# Patient Record
Sex: FEMALE | Race: WHITE | NOT HISPANIC OR LATINO
[De-identification: names, ages, dates, MRNs, and addresses within clinical notes are randomized per-mention and may not be internally consistent; named-entity substitution may affect disease eponyms.]

---

## 2017-02-23 PROBLEM — Z00.00 ENCOUNTER FOR PREVENTIVE HEALTH EXAMINATION: Status: ACTIVE | Noted: 2017-02-23

## 2017-02-27 ENCOUNTER — APPOINTMENT (OUTPATIENT)
Dept: OTOLARYNGOLOGY | Facility: CLINIC | Age: 27
End: 2017-02-27

## 2017-02-27 VITALS
BODY MASS INDEX: 33.38 KG/M2 | DIASTOLIC BLOOD PRESSURE: 98 MMHG | SYSTOLIC BLOOD PRESSURE: 136 MMHG | HEIGHT: 60 IN | WEIGHT: 170 LBS | HEART RATE: 114 BPM

## 2017-02-27 DIAGNOSIS — Z83.3 FAMILY HISTORY OF DIABETES MELLITUS: ICD-10-CM

## 2017-03-13 ENCOUNTER — APPOINTMENT (OUTPATIENT)
Dept: OTOLARYNGOLOGY | Facility: CLINIC | Age: 27
End: 2017-03-13

## 2017-03-13 VITALS
SYSTOLIC BLOOD PRESSURE: 144 MMHG | DIASTOLIC BLOOD PRESSURE: 97 MMHG | HEIGHT: 60 IN | WEIGHT: 170 LBS | HEART RATE: 110 BPM | BODY MASS INDEX: 33.38 KG/M2

## 2017-11-02 ENCOUNTER — MESSAGE (OUTPATIENT)
Age: 27
End: 2017-11-02

## 2020-01-08 ENCOUNTER — APPOINTMENT (OUTPATIENT)
Dept: OTOLARYNGOLOGY | Facility: CLINIC | Age: 30
End: 2020-01-08
Payer: COMMERCIAL

## 2020-01-08 VITALS
HEIGHT: 60 IN | DIASTOLIC BLOOD PRESSURE: 93 MMHG | HEART RATE: 129 BPM | WEIGHT: 170 LBS | SYSTOLIC BLOOD PRESSURE: 152 MMHG | BODY MASS INDEX: 33.38 KG/M2

## 2020-01-08 PROCEDURE — 99214 OFFICE O/P EST MOD 30 MIN: CPT | Mod: 25

## 2020-01-08 PROCEDURE — 69210 REMOVE IMPACTED EAR WAX UNI: CPT

## 2020-01-08 NOTE — ASSESSMENT
[FreeTextEntry1] : wax-\par After informed verbal consent is obtained, cerumen is removed from the right and left  ear canal with a curette and suction.\par Rx: \par Debrox was prescribed and  is to be placed in both ears on a routine basis to keep them free of wax.\par Routine debridement suggested to keep the ears free of wax.\par \par f/u prn

## 2020-01-08 NOTE — HISTORY OF PRESENT ILLNESS
[de-identified] : 28 yo female w/ severe hearing loss x weeks \par poss recent URI\par no other modifying factors\par no nasal or throat complaints\par  [No] : patient does not have a  history of radiation therapy [Eustachian Tube Dysfunction] : eustachian tube dysfunction [None] : No risk factors have been identified.

## 2020-01-08 NOTE — PHYSICAL EXAM
[] : septum deviated bilaterally [de-identified] : bilat wax [Midline] : trachea located in midline position [Normal] : no rashes

## 2020-01-08 NOTE — REASON FOR VISIT
[Subsequent Evaluation] : a subsequent evaluation for [Hearing Loss] : hearing loss [Nasal Obstruction] : nasal obstruction [Parent] : parent

## 2020-01-22 ENCOUNTER — TRANSCRIPTION ENCOUNTER (OUTPATIENT)
Age: 30
End: 2020-01-22

## 2020-09-30 ENCOUNTER — APPOINTMENT (OUTPATIENT)
Dept: OTOLARYNGOLOGY | Facility: CLINIC | Age: 30
End: 2020-09-30

## 2020-11-16 ENCOUNTER — APPOINTMENT (OUTPATIENT)
Dept: OTOLARYNGOLOGY | Facility: CLINIC | Age: 30
End: 2020-11-16

## 2021-09-03 ENCOUNTER — NON-APPOINTMENT (OUTPATIENT)
Age: 31
End: 2021-09-03

## 2021-12-20 ENCOUNTER — APPOINTMENT (OUTPATIENT)
Dept: OTOLARYNGOLOGY | Facility: CLINIC | Age: 31
End: 2021-12-20
Payer: COMMERCIAL

## 2021-12-20 VITALS
TEMPERATURE: 98 F | WEIGHT: 170 LBS | BODY MASS INDEX: 33.38 KG/M2 | SYSTOLIC BLOOD PRESSURE: 152 MMHG | HEART RATE: 93 BPM | HEIGHT: 60 IN | DIASTOLIC BLOOD PRESSURE: 101 MMHG

## 2021-12-20 PROCEDURE — 69210 REMOVE IMPACTED EAR WAX UNI: CPT

## 2021-12-20 PROCEDURE — 99214 OFFICE O/P EST MOD 30 MIN: CPT | Mod: 25

## 2021-12-20 PROCEDURE — 31231 NASAL ENDOSCOPY DX: CPT

## 2021-12-20 NOTE — ASSESSMENT
[FreeTextEntry1] : Patient complains of clogged right ear s/p sinus infection. When she pops her ear it squeaks. On examination ears are impacted with wax bilaterally. On nasal endoscopy has a DNS with mild congestion. \par \par Wax:\par -Cerumen is removed from the right and left  ear canal with a curette and suction.\par -Rx:Debrox be placed in both ears on a routine basis to keep them free of wax.\par -Routine debridement suggested to keep the ears free of wax.\par \par DNS and ETD:\par -Rx: Steam humidification \par -Hypertonic saline nasal irrigations \par -RX: Flonase tx\par -Ct sinus ordered \par \par f/u prn

## 2021-12-20 NOTE — HISTORY OF PRESENT ILLNESS
[No] : patient does not have a  history of radiation therapy [Eustachian Tube Dysfunction] : eustachian tube dysfunction [None] : No risk factors have been identified. [de-identified] : 31 yo female\par Patient complains of she had a cold several weeks ago treated with abx and steroids a week later she had sinus pain treated with pseudophed. She now complains of right clogged ear and when she pops her ear it squeaks. Has mild nasal congestion, using saline spray with mild relief. Pt has no ear pain, ear drainage, hearing loss, tinnitus, vertigo, nasal discharge, epistaxis, sinus infections, facial pain, facial pressure, throat pain, dysphagia or fevers\par \par  \par

## 2021-12-20 NOTE — END OF VISIT
[FreeTextEntry3] : I personally saw and examined RALF WHTINEY in detail. I spoke to JOANNE Jones regarding the assessment and plan of care. I reviewed the above assessment and plan of care, and agree. I have made changes in changes in the body of the note where appropriate.I personally reviewed the HPI, PMH, FH, SH, ROS and medications/allergies. I have spoken to JOANNE Jones regarding the history and have personally determined the assessment and plan of care, and documented this myself. I was present and participated in all key portions of the encounter and all procedures noted above. I have made changes in the body of the note where appropriate.\par \par Attesting Faculty: See Attending Signature Below \par \par \par  [Time Spent: ___ minutes] : I have spent [unfilled] minutes of time on the encounter.

## 2021-12-28 ENCOUNTER — APPOINTMENT (OUTPATIENT)
Dept: OTOLARYNGOLOGY | Facility: CLINIC | Age: 31
End: 2021-12-28
Payer: COMMERCIAL

## 2021-12-28 VITALS
SYSTOLIC BLOOD PRESSURE: 146 MMHG | DIASTOLIC BLOOD PRESSURE: 91 MMHG | HEART RATE: 128 BPM | HEIGHT: 60 IN | WEIGHT: 170 LBS | BODY MASS INDEX: 33.38 KG/M2 | TEMPERATURE: 98 F

## 2021-12-28 PROCEDURE — 99214 OFFICE O/P EST MOD 30 MIN: CPT

## 2021-12-28 NOTE — PHYSICAL EXAM
[Nasal Endoscopy Performed] : nasal endoscopy was performed, see procedure section for findings [] : septum deviated bilaterally [Midline] : trachea located in midline position [Normal] : no rashes [de-identified] : bilat wax

## 2021-12-28 NOTE — ASSESSMENT
[FreeTextEntry1] : Patient with ETD complains of right sided ear pain after blowing her nose last night. \par \par DNS and Right ETDand myringitis\par -Rx: Medrol dose pack and flonase\par -Continue with CT sinus \par \par f/u 1 week or prn

## 2021-12-28 NOTE — END OF VISIT
[Time Spent: ___ minutes] : I have spent [unfilled] minutes of time on the encounter. [FreeTextEntry3] : I personally saw and examined RALF WHITNEY in detail. I spoke to JOANNE Jones regarding the assessment and plan of care. I reviewed the above assessment and plan of care, and agree. I have made changes in changes in the body of the note where appropriate.I personally reviewed the HPI, PMH, FH, SH, ROS and medications/allergies. I have spoken to JOANNE Jones regarding the history and have personally determined the assessment and plan of care, and documented this myself. I was present and participated in all key portions of the encounter and all procedures noted above. I have made changes in the body of the note where appropriate.\par \par Attesting Faculty: See Attending Signature Below \par \par \par  Initial (On Arrival)

## 2021-12-28 NOTE — HISTORY OF PRESENT ILLNESS
[No] : patient does not have a  history of radiation therapy [Eustachian Tube Dysfunction] : eustachian tube dysfunction [None] : No risk factors have been identified. [de-identified] : 29 yo female\par Patient complains of she had a cold several weeks ago treated with abx and steroids a week later she had sinus pain treated with pseudophed. She now complains of right clogged ear and when she pops her ear it squeaks. Has mild nasal congestion, using saline spray with mild relief. Pt has no ear pain, ear drainage, hearing loss, tinnitus, vertigo, nasal discharge, epistaxis, sinus infections, facial pain, facial pressure, throat pain, dysphagia or fevers\par \par  \par  [FreeTextEntry1] : Patient presents for follow up, states she sneezed then she blow her nose immediately after she felt pain in the right ear with sensation that there is fluid buildup in her ear. When turning her head to left she would feel fluid moving. She took Advil it gave her temporary relief. No other modifying factors\par \par

## 2021-12-30 ENCOUNTER — NON-APPOINTMENT (OUTPATIENT)
Age: 31
End: 2021-12-30

## 2022-01-03 ENCOUNTER — APPOINTMENT (OUTPATIENT)
Dept: OTOLARYNGOLOGY | Facility: CLINIC | Age: 32
End: 2022-01-03
Payer: COMMERCIAL

## 2022-01-03 VITALS
TEMPERATURE: 98 F | WEIGHT: 170 LBS | BODY MASS INDEX: 33.38 KG/M2 | HEART RATE: 127 BPM | SYSTOLIC BLOOD PRESSURE: 148 MMHG | DIASTOLIC BLOOD PRESSURE: 95 MMHG | HEIGHT: 60 IN

## 2022-01-03 DIAGNOSIS — H66.91 OTITIS MEDIA, UNSPECIFIED, RIGHT EAR: ICD-10-CM

## 2022-01-03 PROCEDURE — 99214 OFFICE O/P EST MOD 30 MIN: CPT

## 2022-01-03 NOTE — HISTORY OF PRESENT ILLNESS
[No] : patient does not have a  history of radiation therapy [Eustachian Tube Dysfunction] : eustachian tube dysfunction [None] : No risk factors have been identified. [de-identified] : 29 yo female\par Patient complains of she had a cold several weeks ago treated with abx and steroids a week later she had sinus pain treated with pseudophed. She now complains of right clogged ear and when she pops her ear it squeaks. Has mild nasal congestion, using saline spray with mild relief. Pt has no ear pain, ear drainage, hearing loss, tinnitus, vertigo, nasal discharge, epistaxis, sinus infections, facial pain, facial pressure, throat pain, dysphagia or fevers\par \par  \par  [FreeTextEntry1] : 1/3/22: \par Pt presents for follow up, states she continues to have ear pain despite taking Medrol dose pack. Has the need to [op her ears but it doesn’t improve the clogged sensation. \par \par Patient presents for follow up, states she sneezed then she blow her nose immediately after she felt pain in the right ear with sensation that there is fluid buildup in her ear. When turning her head to left she would feel fluid moving. She took Advil it gave her temporary relief. No other modifying factors\par \par

## 2022-01-03 NOTE — PHYSICAL EXAM
[] : septum deviated bilaterally [Midline] : trachea located in midline position [Normal] : no rashes [de-identified] : bilat wax, Right OE superior medial-r/o polyp

## 2022-01-03 NOTE — ASSESSMENT
[FreeTextEntry1] : Patient with ETD complains of right sided ear despite taking Medrol dose pack \par \par Right OE\par -pt deferred ear drops \par -Rx: Medrol dose pack and Levaquin\par -CT Temporal bones ordered \par -Right ear culture collected \par h2o precautions\par \par f/u 1 week or prn

## 2022-01-03 NOTE — END OF VISIT
[Time Spent: ___ minutes] : I have spent [unfilled] minutes of time on the encounter. [FreeTextEntry3] : I personally saw and examined RALF WHITNEY in detail. I spoke to JOANNE Jones regarding the assessment and plan of care. I reviewed the above assessment and plan of care, and agree. I have made changes in changes in the body of the note where appropriate.I personally reviewed the HPI, PMH, FH, SH, ROS and medications/allergies. I have spoken to JOANNE Jones regarding the history and have personally determined the assessment and plan of care, and documented this myself. I was present and participated in all key portions of the encounter and all procedures noted above. I have made changes in the body of the note where appropriate.\par \par Attesting Faculty: See Attending Signature Below \par \par \par

## 2022-01-05 ENCOUNTER — RESULT REVIEW (OUTPATIENT)
Age: 32
End: 2022-01-05

## 2022-01-06 ENCOUNTER — NON-APPOINTMENT (OUTPATIENT)
Age: 32
End: 2022-01-06

## 2022-01-10 ENCOUNTER — APPOINTMENT (OUTPATIENT)
Dept: OTOLARYNGOLOGY | Facility: CLINIC | Age: 32
End: 2022-01-10
Payer: COMMERCIAL

## 2022-01-10 VITALS
WEIGHT: 170 LBS | SYSTOLIC BLOOD PRESSURE: 135 MMHG | HEART RATE: 121 BPM | HEIGHT: 60 IN | TEMPERATURE: 98 F | DIASTOLIC BLOOD PRESSURE: 93 MMHG | BODY MASS INDEX: 33.38 KG/M2

## 2022-01-10 DIAGNOSIS — H60.501 UNSPECIFIED ACUTE NONINFECTIVE OTITIS EXTERNA, RIGHT EAR: ICD-10-CM

## 2022-01-10 LAB — EAR NOSE AND THROAT CULTURE: ABNORMAL

## 2022-01-10 PROCEDURE — 99213 OFFICE O/P EST LOW 20 MIN: CPT

## 2022-01-10 PROCEDURE — 92557 COMPREHENSIVE HEARING TEST: CPT

## 2022-01-10 PROCEDURE — 92567 TYMPANOMETRY: CPT

## 2022-01-10 NOTE — HISTORY OF PRESENT ILLNESS
[No] : patient does not have a  history of radiation therapy [Eustachian Tube Dysfunction] : eustachian tube dysfunction [None] : No risk factors have been identified. [de-identified] : 31 yo female\par Patient complains of she had a cold several weeks ago treated with abx and steroids a week later she had sinus pain treated with pseudophed. She now complains of right clogged ear and when she pops her ear it squeaks. Has mild nasal congestion, using saline spray with mild relief. Pt has no ear pain, ear drainage, hearing loss, tinnitus, vertigo, nasal discharge, epistaxis, sinus infections, facial pain, facial pressure, throat pain, dysphagia or fevers\par \par  \par  [FreeTextEntry1] : 1/10/22:\par Patient presents for follow up s/p right OE. States she is taking oral abx, today is day 4/7. Ear pain resolved but still has the crackling like sound with fullness.  \par \par 1/3/22: \par Pt presents for follow up, states she continues to have ear pain despite taking Medrol dose pack. Has the need to [op her ears but it doesn’t improve the clogged sensation. \par \par Patient presents for follow up, states she sneezed then she blow her nose immediately after she felt pain in the right ear with sensation that there is fluid buildup in her ear. When turning her head to left she would feel fluid moving. She took Advil it gave her temporary relief. No other modifying factors\par \par

## 2022-01-10 NOTE — PHYSICAL EXAM
[] : septum deviated bilaterally [Midline] : trachea located in midline position [Normal] : no rashes [de-identified] : bilat wax, Right OE superior medial-r/o polyp

## 2022-01-10 NOTE — DATA REVIEWED
[de-identified] : Hearing Test performed to evaluate the extent of hearing loss and  to explain pt's symptoms\par today's hearing test was personally reviewed and revealed\par RT: mild to mod-severe mixed HL with abnormal type B tymp\par LT: WNL to moderate SNHL with stiff type As tymp

## 2022-01-10 NOTE — ASSESSMENT
[FreeTextEntry1] : Patient with ETD presents s/p right OE. Today is day 4/7 of oral abx. Ear pain resolved but still has the clogged ear with crackling. \par \par Right OE\par -pt deferred ear drops, if she changes her mind will put on Cortisporin solution \par -Finish Augmentin  and medrol\par -CT Temporal bones pending \par -H2O  precautions\par -Hearing Test performed to evaluate the extent of hearing loss and to explain pt's symptoms\par consider cortisporin sol'n drops (pt allergic to sulfa)\par \par f/u 1 week or prn

## 2022-01-15 ENCOUNTER — OUTPATIENT (OUTPATIENT)
Dept: OUTPATIENT SERVICES | Facility: HOSPITAL | Age: 32
LOS: 1 days | End: 2022-01-15

## 2022-01-15 ENCOUNTER — APPOINTMENT (OUTPATIENT)
Dept: CT IMAGING | Facility: CLINIC | Age: 32
End: 2022-01-15
Payer: COMMERCIAL

## 2022-01-15 DIAGNOSIS — H66.91 OTITIS MEDIA, UNSPECIFIED, RIGHT EAR: ICD-10-CM

## 2022-01-15 PROCEDURE — 70480 CT ORBIT/EAR/FOSSA W/O DYE: CPT | Mod: 26

## 2022-01-19 ENCOUNTER — NON-APPOINTMENT (OUTPATIENT)
Age: 32
End: 2022-01-19

## 2022-01-22 ENCOUNTER — APPOINTMENT (OUTPATIENT)
Dept: CT IMAGING | Facility: CLINIC | Age: 32
End: 2022-01-22

## 2022-02-07 ENCOUNTER — NON-APPOINTMENT (OUTPATIENT)
Age: 32
End: 2022-02-07

## 2022-03-04 ENCOUNTER — APPOINTMENT (OUTPATIENT)
Dept: OTOLARYNGOLOGY | Facility: CLINIC | Age: 32
End: 2022-03-04
Payer: COMMERCIAL

## 2022-03-04 VITALS — HEIGHT: 60 IN | BODY MASS INDEX: 33.38 KG/M2 | TEMPERATURE: 97 F | WEIGHT: 170 LBS

## 2022-03-04 DIAGNOSIS — G93.0 CEREBRAL CYSTS: ICD-10-CM

## 2022-03-04 DIAGNOSIS — H90.11 CONDUCTIVE HEARING LOSS, UNILATERAL, RIGHT EAR, WITH UNRESTRICTED HEARING ON THE CONTRALATERAL SIDE: ICD-10-CM

## 2022-03-04 DIAGNOSIS — H90.3 SENSORINEURAL HEARING LOSS, BILATERAL: ICD-10-CM

## 2022-03-04 PROCEDURE — 99213 OFFICE O/P EST LOW 20 MIN: CPT

## 2022-03-04 NOTE — ASSESSMENT
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

## 2022-03-04 NOTE — REASON FOR VISIT
[Subsequent Evaluation] : a subsequent evaluation for [Hearing Loss] : hearing loss [Nasal Obstruction] : nasal obstruction [Parent] : parent [FreeTextEntry2] : f/u

## 2022-03-04 NOTE — HISTORY OF PRESENT ILLNESS
[No] : patient does not have a  history of radiation therapy [Eustachian Tube Dysfunction] : eustachian tube dysfunction [None] : No risk factors have been identified. [de-identified] : 29 yo female\par Patient complains of she had a cold several weeks ago treated with abx and steroids a week later she had sinus pain treated with pseudophed. She now complains of right clogged ear and when she pops her ear it squeaks. Has mild nasal congestion, using saline spray with mild relief. Pt has no ear pain, ear drainage, hearing loss, tinnitus, vertigo, nasal discharge, epistaxis, sinus infections, facial pain, facial pressure, throat pain, dysphagia or fevers\par \par  \par  [FreeTextEntry1] : 3/4/2022: \par Patient presents for follow up s/p right OE. States ear pain resolved, she is doing better. No other modifying factors\par \par 1/10/22:\par Patient presents for follow up s/p right OE. States she is taking oral abx, today is day 4/7. Ear pain resolved but still has the crackling like sound with fullness.  \par \par 1/3/22: \par Pt presents for follow up, states she continues to have ear pain despite taking Medrol dose pack. Has the need to [op her ears but it doesn’t improve the clogged sensation. \par \par Patient presents for follow up, states she sneezed then she blow her nose immediately after she felt pain in the right ear with sensation that there is fluid buildup in her ear. When turning her head to left she would feel fluid moving. She took Advil it gave her temporary relief. No other modifying factors\par \par

## 2022-03-04 NOTE — PHYSICAL EXAM
[] : septum deviated bilaterally [Midline] : trachea located in midline position [Normal] : no rashes [de-identified] : right OE- now resolved -wnl

## 2022-03-04 NOTE — DATA REVIEWED
[de-identified] : Hearing Test performed to evaluate the extent of hearing loss and  to explain pt's symptoms\par today's hearing test was personally reviewed and revealed\par RT: mild to mod-severe mixed HL with abnormal type B tymp\par LT: WNL to moderate SNHL with stiff type As tymp

## 2022-03-18 ENCOUNTER — RESULT REVIEW (OUTPATIENT)
Age: 32
End: 2022-03-18

## 2022-03-18 ENCOUNTER — OUTPATIENT (OUTPATIENT)
Dept: OUTPATIENT SERVICES | Facility: HOSPITAL | Age: 32
LOS: 1 days | End: 2022-03-18

## 2022-03-18 ENCOUNTER — APPOINTMENT (OUTPATIENT)
Dept: MRI IMAGING | Facility: CLINIC | Age: 32
End: 2022-03-18
Payer: COMMERCIAL

## 2022-03-18 DIAGNOSIS — Z00.8 ENCOUNTER FOR OTHER GENERAL EXAMINATION: ICD-10-CM

## 2022-03-18 PROCEDURE — 70551 MRI BRAIN STEM W/O DYE: CPT | Mod: 26

## 2022-06-16 ENCOUNTER — APPOINTMENT (OUTPATIENT)
Dept: OTOLARYNGOLOGY | Facility: CLINIC | Age: 32
End: 2022-06-16
Payer: COMMERCIAL

## 2022-06-16 VITALS — HEIGHT: 60 IN | WEIGHT: 175 LBS | TEMPERATURE: 98.5 F | BODY MASS INDEX: 34.36 KG/M2

## 2022-06-16 DIAGNOSIS — J31.0 CHRONIC RHINITIS: ICD-10-CM

## 2022-06-16 PROCEDURE — 31231 NASAL ENDOSCOPY DX: CPT

## 2022-06-16 PROCEDURE — 99213 OFFICE O/P EST LOW 20 MIN: CPT | Mod: 25

## 2022-06-17 NOTE — PHYSICAL EXAM
[] : septum deviated bilaterally [Midline] : trachea located in midline position [Nasal Endoscopy Performed] : nasal endoscopy was performed, see procedure section for findings [Normal] : external ears are normal bilaterally

## 2022-06-17 NOTE — HISTORY OF PRESENT ILLNESS
[No] : patient does not have a  history of radiation therapy [Eustachian Tube Dysfunction] : eustachian tube dysfunction [None] : No risk factors have been identified. [de-identified] : 30 yo female\par Patient with hx of right OE presents for follow up, states ear pain and pressure resolved. She does have seasonal allergies has some nasal congestion, not using any nasal sprays at this time. \par

## 2022-06-17 NOTE — DATA REVIEWED
[de-identified] : Hearing Test performed to evaluate the extent of hearing loss and  to explain pt's symptoms\par today's hearing test was personally reviewed and revealed\par RT: mild to mod-severe mixed HL with abnormal type B tymp\par LT: WNL to moderate SNHL with stiff type As tymp

## 2022-06-17 NOTE — ASSESSMENT
[FreeTextEntry1] : Ms. WHITNEY 31 year F with hx of ETD and right OE presents for follow up, ear pain and pressure resolved. She has mild nasal congestion due to seasonal allergies. \par \par Right OE\par -now resolved \par -d/c restrictions \par \par DNS and Rhinitis\par -Rx: Steam humidification \par -Hypertonic saline nasal irrigations \par \par \par f/u 6 months or prn

## 2022-07-27 ENCOUNTER — NON-APPOINTMENT (OUTPATIENT)
Age: 32
End: 2022-07-27

## 2022-08-04 ENCOUNTER — APPOINTMENT (OUTPATIENT)
Dept: OTOLARYNGOLOGY | Facility: CLINIC | Age: 32
End: 2022-08-04

## 2022-08-04 VITALS
DIASTOLIC BLOOD PRESSURE: 91 MMHG | WEIGHT: 175 LBS | HEART RATE: 97 BPM | BODY MASS INDEX: 34.36 KG/M2 | HEIGHT: 60 IN | SYSTOLIC BLOOD PRESSURE: 154 MMHG | TEMPERATURE: 98 F

## 2022-08-04 DIAGNOSIS — H61.23 IMPACTED CERUMEN, BILATERAL: ICD-10-CM

## 2022-08-04 DIAGNOSIS — M26.609 UNSPECIFIED TEMPOROMANDIBULAR JOINT DISORDER: ICD-10-CM

## 2022-08-04 DIAGNOSIS — K21.9 GASTRO-ESOPHAGEAL REFLUX DISEASE W/OUT ESOPHAGITIS: ICD-10-CM

## 2022-08-04 PROCEDURE — 99214 OFFICE O/P EST MOD 30 MIN: CPT

## 2022-08-04 NOTE — PHYSICAL EXAM
[Midline] : trachea located in midline position [de-identified] : bilat crepitus [de-identified] : diffuse erythema [Normal] : no rashes

## 2022-08-04 NOTE — END OF VISIT
[FreeTextEntry3] : I personally saw and examined RALF WHITNEY in detail. I spoke to JOANNE Jones regarding the assessment and plan of care. I reviewed the above assessment and plan of care, and agree. I have made changes in changes in the body of the note where appropriate.I personally reviewed the HPI, PMH, FH, SH, ROS and medications/allergies. I have spoken to JOANNE Jones regarding the history and have personally determined the assessment and plan of care, and documented this myself. I was present and participated in all key portions of the encounter and all procedures noted above. I have made changes in the body of the note where appropriate.\par \par Attesting Faculty: See Attending Signature Below \par \par \par  [Time Spent: ___ minutes] : I have spent [unfilled] minutes of time on the encounter.

## 2022-08-04 NOTE — HISTORY OF PRESENT ILLNESS
[de-identified] : 30 yo female\par Patient complains she was treated for an ear infection 10 days ago. There is no ear pain now but her ear does feel clogged. She does feel more congested during the summer months. Not using any nasal sprays at this time.  [Ear Fullness] : ear fullness [Tinnitus] : no tinnitus [Hearing Loss] : no hearing loss [Otalgia] : otalgia [Otorrhea] : no otorrhea [Eustachian Tube Dysfunction] : eustachian tube dysfunction [Early Onset Hearing Loss] : no early onset hearing loss [Stroke] : no stroke [Allergic Rhinitis] : no allergic rhinitis [Asthma] : no asthma [None] : No associated symptoms are reported.

## 2022-08-04 NOTE — ASSESSMENT
[FreeTextEntry1] : Ms. 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

## 2022-08-24 ENCOUNTER — NON-APPOINTMENT (OUTPATIENT)
Age: 32
End: 2022-08-24

## 2022-09-13 ENCOUNTER — NON-APPOINTMENT (OUTPATIENT)
Age: 32
End: 2022-09-13

## 2022-09-28 ENCOUNTER — APPOINTMENT (OUTPATIENT)
Dept: OTOLARYNGOLOGY | Facility: CLINIC | Age: 32
End: 2022-09-28

## 2022-09-28 VITALS
BODY MASS INDEX: 34.36 KG/M2 | HEIGHT: 60 IN | OXYGEN SATURATION: 100 % | HEART RATE: 132 BPM | WEIGHT: 175 LBS | DIASTOLIC BLOOD PRESSURE: 95 MMHG | SYSTOLIC BLOOD PRESSURE: 127 MMHG

## 2022-09-28 PROCEDURE — 31231 NASAL ENDOSCOPY DX: CPT

## 2022-09-28 PROCEDURE — 92567 TYMPANOMETRY: CPT

## 2022-09-28 PROCEDURE — 99214 OFFICE O/P EST MOD 30 MIN: CPT | Mod: 25

## 2022-09-28 PROCEDURE — 92557 COMPREHENSIVE HEARING TEST: CPT

## 2022-09-28 NOTE — ASSESSMENT
[FreeTextEntry1] : Patient with chronic eustachian tube dysfunction of her right ear currently she has been treated with steroids in the past last 1 was back in December after she had COVID starting to feel once again occasional popping in her right ear endoscopically she does have a deviated septum to the right no tumors masses or polyps I have encouraged her to use Flonase religiously for a month should resolve her symptoms and I gave her prescription for Medrol Dosepak to use if it does not if she does not respond to Flonase audiogram was performed confirmed that her hearing is back to her her baseline with a mild sloping high-frequency sensorineural hearing loss symmetrical in nature.

## 2022-09-28 NOTE — HISTORY OF PRESENT ILLNESS
[de-identified] : Janie was sick a few y ears ago and ended up with ear clogging that lingered after. SHe saw Dr Tejada and this ear issue resolved. LAst year she got COVID and the right ear clogging  back. THis lingered again and went zaria in 2022. A few weeks ago she had a right ear infection according to the urgent care doctor so followed up with Dr Tejada who advised she has TMJ  and acid reflux, otherwise no ear issue. \par SHe continues to feel that the right ear is clogged. SHe does not have any pain in the ear, ringing in the ear or dizziness SHe does occasionally feel shifting of her jaw and this opens the ear temporarily. \par She has seasonal allergies and uses Zyrtec and Sudafed but no nasal sprays. \par She used to auto inflate her ears but stopped because that makes this sensation worse

## 2022-09-28 NOTE — END OF VISIT
[FreeTextEntry3] : I, Dr. Moctezuma personally performed the evaluation and management (E/M) services , including all procedures, for this established patient who presents today with (a) new problem(s)/exacerbation of (an) existing condition(s). That E/M includes conducting the clinically appropriate interval history &/or exam, assessing all new/exacerbated conditions, and establishing a new plan of care. Today, my BRANDEN, Debbie Mckeon, was here to observe &/or participate in the visit & follow plan of care established by me.\par \par \par

## 2022-09-28 NOTE — REVIEW OF SYSTEMS
[Seasonal Allergies] : seasonal allergies [Negative] : Heme/Lymph [de-identified] : ear clogging and pressure

## 2022-10-31 ENCOUNTER — NON-APPOINTMENT (OUTPATIENT)
Age: 32
End: 2022-10-31

## 2022-10-31 ENCOUNTER — APPOINTMENT (OUTPATIENT)
Dept: OTOLARYNGOLOGY | Facility: CLINIC | Age: 32
End: 2022-10-31

## 2022-10-31 VITALS
BODY MASS INDEX: 34.36 KG/M2 | TEMPERATURE: 98.1 F | SYSTOLIC BLOOD PRESSURE: 129 MMHG | HEART RATE: 115 BPM | WEIGHT: 175 LBS | DIASTOLIC BLOOD PRESSURE: 88 MMHG | HEIGHT: 60 IN

## 2022-10-31 PROCEDURE — 99214 OFFICE O/P EST MOD 30 MIN: CPT | Mod: 25

## 2022-10-31 PROCEDURE — 31231 NASAL ENDOSCOPY DX: CPT

## 2022-10-31 NOTE — HISTORY OF PRESENT ILLNESS
[de-identified] : Patient updates us that she is now pregnant, she is here today with worsening of right ear pain since the last visit. SHe initially improved, but this past weekend the right ear pain was severe with some right ear muffling. SHe had mild fevers as well. When she tilts her head she hears swishing of liquid \par She felt congested over the weekend and thought the allergies were acting up

## 2022-10-31 NOTE — PHYSICAL EXAM
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

## 2022-10-31 NOTE — ASSESSMENT
[FreeTextEntry1] : Patient now 6 weeks pregnant severe right ear pain examination bulging tympanic membrane consistent with bullous myringitis.  I put her on some Ciprodex drops if she is not better we will see if she can take Amoxil amoxicillin she is seeing her obstetrician on Thursday to ask for approval.  Obviously erythromycin is not antibiotic of choice during first trimester.  If there is no relief she may return for myringotomy.  Hopefully she will be able to get through this and respond fairly quickly in the next couple days. independent

## 2022-11-07 ENCOUNTER — APPOINTMENT (OUTPATIENT)
Dept: OTOLARYNGOLOGY | Facility: CLINIC | Age: 32
End: 2022-11-07

## 2022-11-07 VITALS
BODY MASS INDEX: 34.36 KG/M2 | HEIGHT: 60 IN | DIASTOLIC BLOOD PRESSURE: 94 MMHG | HEART RATE: 105 BPM | WEIGHT: 175 LBS | TEMPERATURE: 98.4 F | SYSTOLIC BLOOD PRESSURE: 148 MMHG

## 2022-11-07 DIAGNOSIS — J34.2 DEVIATED NASAL SEPTUM: ICD-10-CM

## 2022-11-07 DIAGNOSIS — H73.011 BULLOUS MYRINGITIS, RIGHT EAR: ICD-10-CM

## 2022-11-07 PROCEDURE — 99213 OFFICE O/P EST LOW 20 MIN: CPT

## 2022-11-07 NOTE — HISTORY OF PRESENT ILLNESS
[de-identified] : Patient still has crackling in the right ear and has some pressure. She admits that the discomfort is much improved. She has stopped the ear drops, and decided to start the antibiotic with the clearance from the OB, so is almost done with the amoxicillin. She still has nasal congestion as well.

## 2022-11-07 NOTE — PHYSICAL EXAM
[Nasal Endoscopy Performed] : nasal endoscopy was performed, see procedure section for findings [] : septum deviated to the right [Midline] : trachea located in midline position [Normal] : no rashes [de-identified] : fluid behind the right TM

## 2022-11-07 NOTE — REVIEW OF SYSTEMS
[Nasal Congestion] : nasal congestion [Negative] : Heme/Lymph [de-identified] : right ear clogging

## 2022-11-28 ENCOUNTER — APPOINTMENT (OUTPATIENT)
Dept: OTOLARYNGOLOGY | Facility: CLINIC | Age: 32
End: 2022-11-28

## 2023-04-13 ENCOUNTER — APPOINTMENT (OUTPATIENT)
Dept: OTOLARYNGOLOGY | Facility: CLINIC | Age: 33
End: 2023-04-13
Payer: COMMERCIAL

## 2023-04-13 VITALS
DIASTOLIC BLOOD PRESSURE: 98 MMHG | BODY MASS INDEX: 37.69 KG/M2 | HEART RATE: 93 BPM | WEIGHT: 192 LBS | TEMPERATURE: 97.8 F | HEIGHT: 60 IN | SYSTOLIC BLOOD PRESSURE: 157 MMHG

## 2023-04-13 DIAGNOSIS — R07.0 PAIN IN THROAT: ICD-10-CM

## 2023-04-13 DIAGNOSIS — H93.8X3 OTHER SPECIFIED DISORDERS OF EAR, BILATERAL: ICD-10-CM

## 2023-04-13 DIAGNOSIS — R09.81 NASAL CONGESTION: ICD-10-CM

## 2023-04-13 PROCEDURE — 99214 OFFICE O/P EST MOD 30 MIN: CPT

## 2023-04-13 NOTE — ASSESSMENT
[FreeTextEntry1] : 29-week pregnant a little bit of a sore throat looks like early pharyngitis culture was taken no further acute intervention indicated unless returns positive for strep.

## 2023-04-13 NOTE — PHYSICAL EXAM
[Nasal Endoscopy Performed] : nasal endoscopy was performed, see procedure section for findings [] : septum deviated to the right [Midline] : trachea located in midline position [Normal] : no rashes [de-identified] : MILD ERYTHEMA

## 2023-04-13 NOTE — HISTORY OF PRESENT ILLNESS
[de-identified] : Patient seen about 5 months ago for crackling and clogging in the ears, found to have bullous myringitis.\par  She is now currently 7 months pregnant and her ears are doing well. \par She has had intermittent ear clogging and worsening of throat pain. \par She has a history of seasonal allergies and has some nasal congestion. \par She just wants to make sure that she doesn’t have any sinusitis and that it is just allergies. \par SHe has had some mild reflux during this pregnancy \par

## 2023-04-17 ENCOUNTER — NON-APPOINTMENT (OUTPATIENT)
Age: 33
End: 2023-04-17

## 2023-04-17 LAB — EAR NOSE AND THROAT CULTURE: ABNORMAL

## 2023-05-19 ENCOUNTER — INPATIENT (INPATIENT)
Facility: HOSPITAL | Age: 33
LOS: 4 days | Discharge: HOME | End: 2023-05-24
Attending: OBSTETRICS & GYNECOLOGY | Admitting: OBSTETRICS & GYNECOLOGY
Payer: COMMERCIAL

## 2023-05-19 ENCOUNTER — TRANSCRIPTION ENCOUNTER (OUTPATIENT)
Age: 33
End: 2023-05-19

## 2023-05-19 VITALS — HEART RATE: 104 BPM | OXYGEN SATURATION: 100 %

## 2023-05-19 DIAGNOSIS — O26.899 OTHER SPECIFIED PREGNANCY RELATED CONDITIONS, UNSPECIFIED TRIMESTER: ICD-10-CM

## 2023-05-19 DIAGNOSIS — Z34.80 ENCOUNTER FOR SUPERVISION OF OTHER NORMAL PREGNANCY, UNSPECIFIED TRIMESTER: ICD-10-CM

## 2023-05-19 LAB
ALBUMIN SERPL ELPH-MCNC: 3.8 G/DL — SIGNIFICANT CHANGE UP (ref 3.3–5)
ALP SERPL-CCNC: 198 U/L — HIGH (ref 40–120)
ALT FLD-CCNC: 53 U/L — HIGH (ref 10–45)
ANION GAP SERPL CALC-SCNC: 17 MMOL/L — SIGNIFICANT CHANGE UP (ref 5–17)
APPEARANCE UR: CLEAR — SIGNIFICANT CHANGE UP
APTT BLD: 29.1 SEC — SIGNIFICANT CHANGE UP (ref 27.5–35.5)
AST SERPL-CCNC: 57 U/L — HIGH (ref 10–40)
BACTERIA # UR AUTO: ABNORMAL
BASOPHILS # BLD AUTO: 0.1 K/UL — SIGNIFICANT CHANGE UP (ref 0–0.2)
BASOPHILS NFR BLD AUTO: 0.6 % — SIGNIFICANT CHANGE UP (ref 0–2)
BILIRUB SERPL-MCNC: 0.4 MG/DL — SIGNIFICANT CHANGE UP (ref 0.2–1.2)
BILIRUB UR-MCNC: NEGATIVE — SIGNIFICANT CHANGE UP
BLD GP AB SCN SERPL QL: NEGATIVE — SIGNIFICANT CHANGE UP
BUN SERPL-MCNC: 12 MG/DL — SIGNIFICANT CHANGE UP (ref 7–23)
CALCIUM SERPL-MCNC: 9.7 MG/DL — SIGNIFICANT CHANGE UP (ref 8.4–10.5)
CHLORIDE SERPL-SCNC: 102 MMOL/L — SIGNIFICANT CHANGE UP (ref 96–108)
CO2 SERPL-SCNC: 18 MMOL/L — LOW (ref 22–31)
COLOR SPEC: COLORLESS — SIGNIFICANT CHANGE UP
CREAT ?TM UR-MCNC: 22 MG/DL — SIGNIFICANT CHANGE UP
CREAT SERPL-MCNC: 0.96 MG/DL — SIGNIFICANT CHANGE UP (ref 0.5–1.3)
DIFF PNL FLD: NEGATIVE — SIGNIFICANT CHANGE UP
EGFR: 80 ML/MIN/1.73M2 — SIGNIFICANT CHANGE UP
EOSINOPHIL # BLD AUTO: 0.05 K/UL — SIGNIFICANT CHANGE UP (ref 0–0.5)
EOSINOPHIL NFR BLD AUTO: 0.3 % — SIGNIFICANT CHANGE UP (ref 0–6)
EPI CELLS # UR: 4 /HPF — SIGNIFICANT CHANGE UP
FIBRINOGEN PPP-MCNC: 579 MG/DL — HIGH (ref 200–445)
GLUCOSE BLDC GLUCOMTR-MCNC: 78 MG/DL — SIGNIFICANT CHANGE UP (ref 70–99)
GLUCOSE SERPL-MCNC: 81 MG/DL — SIGNIFICANT CHANGE UP (ref 70–99)
GLUCOSE UR QL: NEGATIVE — SIGNIFICANT CHANGE UP
HCT VFR BLD CALC: 44.8 % — SIGNIFICANT CHANGE UP (ref 34.5–45)
HGB BLD-MCNC: 15.5 G/DL — SIGNIFICANT CHANGE UP (ref 11.5–15.5)
HYALINE CASTS # UR AUTO: 1 /LPF — SIGNIFICANT CHANGE UP (ref 0–2)
IMM GRANULOCYTES NFR BLD AUTO: 1 % — HIGH (ref 0–0.9)
INR BLD: 0.97 RATIO — SIGNIFICANT CHANGE UP (ref 0.88–1.16)
KETONES UR-MCNC: NEGATIVE — SIGNIFICANT CHANGE UP
LDH SERPL L TO P-CCNC: 271 U/L — HIGH (ref 50–242)
LEUKOCYTE ESTERASE UR-ACNC: ABNORMAL
LYMPHOCYTES # BLD AUTO: 20.3 % — SIGNIFICANT CHANGE UP (ref 13–44)
LYMPHOCYTES # BLD AUTO: 3.17 K/UL — SIGNIFICANT CHANGE UP (ref 1–3.3)
MCHC RBC-ENTMCNC: 30.6 PG — SIGNIFICANT CHANGE UP (ref 27–34)
MCHC RBC-ENTMCNC: 34.6 GM/DL — SIGNIFICANT CHANGE UP (ref 32–36)
MCV RBC AUTO: 88.4 FL — SIGNIFICANT CHANGE UP (ref 80–100)
MONOCYTES # BLD AUTO: 0.73 K/UL — SIGNIFICANT CHANGE UP (ref 0–0.9)
MONOCYTES NFR BLD AUTO: 4.7 % — SIGNIFICANT CHANGE UP (ref 2–14)
NEUTROPHILS # BLD AUTO: 11.39 K/UL — HIGH (ref 1.8–7.4)
NEUTROPHILS NFR BLD AUTO: 73.1 % — SIGNIFICANT CHANGE UP (ref 43–77)
NITRITE UR-MCNC: NEGATIVE — SIGNIFICANT CHANGE UP
NRBC # BLD: 0 /100 WBCS — SIGNIFICANT CHANGE UP (ref 0–0)
PH UR: 6.5 — SIGNIFICANT CHANGE UP (ref 5–8)
PLATELET # BLD AUTO: 286 K/UL — SIGNIFICANT CHANGE UP (ref 150–400)
POTASSIUM SERPL-MCNC: 4.5 MMOL/L — SIGNIFICANT CHANGE UP (ref 3.5–5.3)
POTASSIUM SERPL-SCNC: 4.5 MMOL/L — SIGNIFICANT CHANGE UP (ref 3.5–5.3)
PROT ?TM UR-MCNC: <7 MG/DL — SIGNIFICANT CHANGE UP (ref 0–12)
PROT SERPL-MCNC: 7.4 G/DL — SIGNIFICANT CHANGE UP (ref 6–8.3)
PROT UR-MCNC: NEGATIVE — SIGNIFICANT CHANGE UP
PROT/CREAT UR-RTO: <0.3 RATIO — HIGH (ref 0–0.2)
PROTHROM AB SERPL-ACNC: 11.3 SEC — SIGNIFICANT CHANGE UP (ref 10.5–13.4)
RBC # BLD: 5.07 M/UL — SIGNIFICANT CHANGE UP (ref 3.8–5.2)
RBC # FLD: 13.7 % — SIGNIFICANT CHANGE UP (ref 10.3–14.5)
RBC CASTS # UR COMP ASSIST: 2 /HPF — SIGNIFICANT CHANGE UP (ref 0–4)
RH IG SCN BLD-IMP: POSITIVE — SIGNIFICANT CHANGE UP
SODIUM SERPL-SCNC: 137 MMOL/L — SIGNIFICANT CHANGE UP (ref 135–145)
SP GR SPEC: 1 — LOW (ref 1.01–1.02)
URATE SERPL-MCNC: 7.9 MG/DL — HIGH (ref 2.5–7)
UROBILINOGEN FLD QL: NEGATIVE — SIGNIFICANT CHANGE UP
WBC # BLD: 15.59 K/UL — HIGH (ref 3.8–10.5)
WBC # FLD AUTO: 15.59 K/UL — HIGH (ref 3.8–10.5)
WBC UR QL: 2 /HPF — SIGNIFICANT CHANGE UP (ref 0–5)

## 2023-05-19 RX ORDER — MAGNESIUM SULFATE 500 MG/ML
2 VIAL (ML) INJECTION
Qty: 40 | Refills: 0 | Status: DISCONTINUED | OUTPATIENT
Start: 2023-05-19 | End: 2023-05-20

## 2023-05-19 RX ORDER — LEVOTHYROXINE SODIUM 125 MCG
25 TABLET ORAL DAILY
Refills: 0 | Status: DISCONTINUED | OUTPATIENT
Start: 2023-05-19 | End: 2023-05-24

## 2023-05-19 RX ORDER — AMPICILLIN TRIHYDRATE 250 MG
1 CAPSULE ORAL EVERY 4 HOURS
Refills: 0 | Status: DISCONTINUED | OUTPATIENT
Start: 2023-05-19 | End: 2023-05-21

## 2023-05-19 RX ORDER — MAGNESIUM SULFATE 500 MG/ML
2 VIAL (ML) INJECTION
Qty: 40 | Refills: 0 | Status: DISCONTINUED | OUTPATIENT
Start: 2023-05-19 | End: 2023-05-19

## 2023-05-19 RX ORDER — CITRIC ACID/SODIUM CITRATE 300-500 MG
15 SOLUTION, ORAL ORAL EVERY 6 HOURS
Refills: 0 | Status: DISCONTINUED | OUTPATIENT
Start: 2023-05-19 | End: 2023-05-21

## 2023-05-19 RX ORDER — NIFEDIPINE 30 MG
30 TABLET, EXTENDED RELEASE 24 HR ORAL DAILY
Refills: 0 | Status: DISCONTINUED | OUTPATIENT
Start: 2023-05-19 | End: 2023-05-22

## 2023-05-19 RX ORDER — CHLORHEXIDINE GLUCONATE 213 G/1000ML
1 SOLUTION TOPICAL DAILY
Refills: 0 | Status: DISCONTINUED | OUTPATIENT
Start: 2023-05-19 | End: 2023-05-21

## 2023-05-19 RX ORDER — SODIUM CHLORIDE 9 MG/ML
1000 INJECTION, SOLUTION INTRAVENOUS
Refills: 0 | Status: DISCONTINUED | OUTPATIENT
Start: 2023-05-19 | End: 2023-05-20

## 2023-05-19 RX ORDER — SODIUM CHLORIDE 9 MG/ML
1000 INJECTION INTRAMUSCULAR; INTRAVENOUS; SUBCUTANEOUS
Refills: 0 | Status: DISCONTINUED | OUTPATIENT
Start: 2023-05-19 | End: 2023-05-20

## 2023-05-19 RX ORDER — OXYTOCIN 10 UNIT/ML
333.33 VIAL (ML) INJECTION
Qty: 20 | Refills: 0 | Status: DISCONTINUED | OUTPATIENT
Start: 2023-05-19 | End: 2023-05-21

## 2023-05-19 RX ORDER — AMPICILLIN TRIHYDRATE 250 MG
2 CAPSULE ORAL ONCE
Refills: 0 | Status: COMPLETED | OUTPATIENT
Start: 2023-05-19 | End: 2023-05-19

## 2023-05-19 RX ORDER — MAGNESIUM SULFATE 500 MG/ML
4 VIAL (ML) INJECTION ONCE
Refills: 0 | Status: DISCONTINUED | OUTPATIENT
Start: 2023-05-19 | End: 2023-05-19

## 2023-05-19 RX ORDER — MAGNESIUM SULFATE 500 MG/ML
4 VIAL (ML) INJECTION ONCE
Refills: 0 | Status: COMPLETED | OUTPATIENT
Start: 2023-05-19 | End: 2023-05-19

## 2023-05-19 RX ORDER — LABETALOL HCL 100 MG
20 TABLET ORAL ONCE
Refills: 0 | Status: COMPLETED | OUTPATIENT
Start: 2023-05-19 | End: 2023-05-19

## 2023-05-19 RX ADMIN — Medication 200 GRAM(S): at 21:52

## 2023-05-19 RX ADMIN — Medication 30 MILLIGRAM(S): at 20:43

## 2023-05-19 RX ADMIN — Medication 50 GM/HR: at 20:42

## 2023-05-19 RX ADMIN — Medication 20 MILLIGRAM(S): at 20:22

## 2023-05-19 RX ADMIN — Medication 300 GRAM(S): at 20:24

## 2023-05-19 NOTE — OB RN PATIENT PROFILE - FUNCTIONAL ASSESSMENT - BASIC MOBILITY 6.
4-calculated by average/Not able to assess (calculate score using [facility] averaging method)

## 2023-05-19 NOTE — OB PROVIDER H&P - HISTORY OF PRESENT ILLNESS
Pt is a 33yo G1 @ 34w3d here with elevated BPs in PN office. Pt had 2 severe range BPs, received Labetalol 20 IVP and started on Magnesium. Pt. denies headache, visual changes, chest/epigastric pain, N/V. Pt. also denies LOF, VB, CTX. +FM. PNC uncomplicated     OBHx: primip  GYNHx: denies ovarian cysts, fibroids, hx of abnormal pap or STDs  PMHx: Hypothyroid  PSurgHx: denies  Allergies: Sulfa drugs: rash; shellfish: rash; peanuts: rash  Meds: PNV, Synthroid 25mcg, Baby ASA  Social: No smoking, alcohol, or illicit drug use  Psych: No hx of anxiety or depression

## 2023-05-19 NOTE — OB RN PATIENT PROFILE - FALL HARM RISK - UNIVERSAL INTERVENTIONS
Bed in lowest position, wheels locked, appropriate side rails in place/Call bell, personal items and telephone in reach/Instruct patient to call for assistance before getting out of bed or chair/Non-slip footwear when patient is out of bed/[place] to call system/Physically safe environment - no spills, clutter or unnecessary equipment/Purposeful Proactive Rounding/Room/bathroom lighting operational, light cord in reach

## 2023-05-19 NOTE — OB RN TRIAGE NOTE - FALL HARM RISK - UNIVERSAL INTERVENTIONS
Bed in lowest position, wheels locked, appropriate side rails in place/Call bell, personal items and telephone in reach/Instruct patient to call for assistance before getting out of bed or chair/Non-slip footwear when patient is out of bed/[place] to call system/Physically safe environment - no spills, clutter or unnecessary equipment/Purposeful Proactive Rounding/Room/bathroom lighting operational, light cord in reach

## 2023-05-19 NOTE — OB PROVIDER TRIAGE NOTE - HISTORY OF PRESENT ILLNESS
Pt is a 31yo G1 @ 34w3d here with elevated BPs in PN office. Pt. denies headache, visual changes, chest/epigastric pain, N/V. Pt. also denies LOF, VB, CTX. +FM. PNC uncomplicated     OBHx: primip  GYNHx: denies ovarian cysts, fibroids, hx of abnormal pap or STDs  PMHx: Hypothyroid  PSurgHx: denies  Allergies: Sulfa drugs: rash; shellfish: rash; peanuts: rash  Meds: PNV, Synthroid 25mcg, Baby ASA  Social: No smoking, alcohol, or illicit drug use  Psych: No hx of anxiety or depression Pt is a 31yo G1 @ 34w3d here with elevated BPs in PN office. Pt. denies headache, visual changes, chest/epigastric pain, N/V. Pt. also denies LOF, VB, CTX. +FM. PNC c/b GDMA1, FS nl    OBHx: primip  GYNHx: denies ovarian cysts, fibroids, hx of abnormal pap or STDs  PMHx: Hypothyroid  PSurgHx: denies  Allergies: Sulfa drugs: rash; shellfish: rash; peanuts: rash  Meds: PNV, Synthroid 25mcg, Baby ASA  Social: No smoking, alcohol, or illicit drug use  Psych: No hx of anxiety or depression

## 2023-05-19 NOTE — OB RN PATIENT PROFILE - BILL OF RIGHTS/ADMISSION INFORMATION PROVIDED TO:
8/15/2022    From the office of:   Monica Hinkle MD   [facility], [address]  [address]  [place] [address]  Phone: [phone]  Fax: [phone]    In regards to Maddi Long, :  1955    Chief Complaint   Patient presents with   • Eye Exam   • Office Visit     Here for a VF 24-2 and OCT-MAC     RH   Pfizer covid vaccine 21, 21, booster 21  Shingrix vaccine 10/23/19, 20     Technicians note reviewed 8/15/2022, Monica Hinkle MD.     HISTORY OF PRESENT ILLNESS: 67 year old female with a h/o AODM, HTN,   hypercholesteremia, old  PVDs (posterior vitreous detachments both eyes)  BOTH EYES, high myopia/astigmatism, glaucoma suspect based on ocular HTN,  mild epiretinal membrane (ERM) left and is here for a VF 24-2 and OCT-MAC.   She sees her cousin Dr. De La O who is an Optom for new glasses, had new glasses made this year, she does not want a refraction charge.   The patient denies having any new floaters, loss of side vision or flashes in either eye.    She does not check her BS at home at all. She switched PCPs and is having an MRI brain done 22 because she hears \"swishing\" in her head. (Pulsatile tinnitus).  She is seeing Dr. Duvall 22 for chest pain.         She has had arm pain and says she was put on po prednisone - reviewed note 2020 \"suspect PMR\"  Trial of steroids - pt not on them now   Lab review 2020 Hep C non reactive , ESR 15, CRP 22.8 (high normal is <10)     Blood Sugar = does not check          Last A1C#:   Hemoglobin A1C (%)   Date Value   2022 8.1 (H)     No results found for: [id number]  Blood Sugar:     Glucose (mg/dL)   Date Value   2022 145 (H)            SH:  Never a SMOKER and NO ALCOHOL CONSUMPTION, Rarely, no drugs    ROS, Mood, Affect:  A&O x 3, happy. See full ROS sheet patient completed and smart chart (I reviewed).  ROS:   General: No fevers, chills, sweats,   Skin: No rash, 
bruising, bleeding   Head:  no changes in hearing   Eyes: see above   Chest: No cough, SOB, chest pain, palpations   GI/: No abdominal pain, diarrhea, constipation, dysuria, increased frequency of urination, urgency to urinate,   Muscle/Skeletal: No fatigue   Psych: No anxiety, depression, thoughts of hurting self or others   Neuro: No LOC, numbness or tingling in extremities,   Endocrine: Denies weight changes, changes in thirst, chills, hot or cold intolerance    POH:  Shingrix 10-23-19, 02/07/20  Oculr HTN First time high IOP 23/24 on 12-19-18 glaucoma suspect , Positive FHX Glaucoma ( Father & Paternal Grandmother)  High myopia and astigmatism topography No DANNIELLE  first glasses 8 yo   RIGHT EYE  44.50, 49.12x84 regular on 10-31-12 topography   LEFT EYE  44.37, 48.37x 090 regular  cataracts BOTH EYES   PVD BOTH EYES  saw Leonor 2011   Mild Extrafoveal ERM RIGHT EYE , mild ERM LEFT EYE   Ptosis BOTH EYES  upper lids mild  Myopia BOTH EYES   Gets glasses at her cousin's [facility] O[facility]D.   Denies LASIK PRK amblyopia eye trauma     PMH/PSH:    AODM 2016  GERD  Hypercholesteremia   HTN,   Shingles right forehead years ago   Lichen planus  Ventral hernia     PVD (posterior vitreous detachment), both eyes                Cataracts, bilateral                                          Macular pucker, left eye                        11/13/2014    Lichen simplex chronicus                                      Type 2 diabetes mellitus without complication,* 12/18/2017    Hypertension                                    07/08/2020      Comment: Last Assessment & Plan:  Formatting of this                note might be different from the original.                Dyslipidemia, stable, on statins, no myalgias,                tolerates meds well, reports no side effects.                Role of statins discussed, side effects                reviewed.    Glaucoma suspect of both eyes                   12/19/2018    Essential hypertension, 
benign                  12/14/2017    Dyslipidemia                                    12/14/2017    Hiatal hernia                                               Tonsillectomy, hysterectomy no cancer, Bladder Repair 1/15/2014 robot, breast bx benign 2018  Past Surgical History:   Procedure Laterality Date   • Biopsy of breast, incisional     • Bladder repair  01/15/2014   • Colonoscopy diagnostic  11/09/2007    Colonoscopy, Dx   • Cystourethroscopy  11/09/2007   • Laser ablation of condylomas     • Repair of rectocele     • Total abdom hysterectomy           PMD:  Jhonny Christensen MD    ALLERGIES:   Allergen Reactions   • Nitrofurantoin Macrocrystal SHORTNESS OF BREATH and PRURITUS     THROAT itches   • Promethazine Hcl Other (See Comments)     Pt could not function, could not move.  Felt very weird.  Told not to take again.   • Sulfa Antibiotics SHORTNESS OF BREATH   • Dolasetron Other (See Comments)     Patient states that she was \"unable to move\".   • Pain Relief VOMITING     Pertinent systemic meds: See EPIC for full med list  Was On Prednisone For 4-5 Days  ( Nov. 2016) For Severe Bronchitis/ Pneumonia    FH:  I reviewed the technicians history as outlined in EPIC.; there are no additions.    FAMILY OCULAR HX    Glaucoma: yes, Father & Paternal Grandmother    Retinal detachment: no    Macular degeneration:  no    Amblyopia (lazy eye), strabismus (ET, XT, etc.): no    Eye surgery: Cataract Surgery Paternal Grandmother    Other eye diseases: no    Eye meds: Systane PRN- has not used in a while       Exam:  Base Eye Exam     Visual Acuity (Snellen - Linear)       Right Left    Dist cc 20/30 -2 20/40 -1    Dist ph cc No Improvement No Improvement    Correction: Glasses          Tonometry (Applanation, 11:24 AM)       Right Left    Pressure 23 25          Pupils       Pupils APD    Right PERRL Negative    Left PERRL Negative          Visual Fields (Counting fingers)       Left Right     Full Full          Extraocular 
Movement       Right Left     Full, Ortho Full, Ortho          Neuro/Psych     Oriented x3: Yes    Mood/Affect: Normal            Slit Lamp and Fundus Exam     External Exam       Right Left    External Dermatochalasis Dermatochalasis          Slit Lamp Exam       Right Left    Lids/Lashes Normal Normal    Conjunctiva/Sclera Clear Clear    Cornea Clear, rare pigment on endothelium  Clear, rare pigment on endothelium     Anterior Chamber Deep and quiet Deep and quiet    Iris Round and regular, No neovascularization or transilluminations defects   Round and regular, No neovascularization or transilluminations defects      Lens 2+ Nuclear sclerosis 2+ Nuclear sclerosis, some posterior thickening     Vitreous Clear, Posterior vitreous detachment Clear, synuresis  ment          Fundus Exam       Right Left    Disc Flat, pink with a healthy rim Flat, pink with a healthy rim    C/D Ratio 0.2 0.2    Macula Flat and dull, mild non center mauclar pucker , , No clinicically significant macular edema, microaneurysms, hemorrhage, cotton wool spots or exudates  Flat and dull, mild macular pucker temporally, faint ERM, No clinicically significant macular edema, microaneurysms, hemorrhage, cotton wool spots or exudates     Vessels Healthy with normal Artery-Vein ratio Healthy with normal Artery-Vein ratio    Periphery Flat, healthy, without tears or detachments to depression 360 Flat, healthy, without tears or detachments to depression 360            Refraction     Wearing Rx       Sphere Cylinder Axis Add    Right -8.00 +4.75 087 +2.50    Left -8.25 +4.25 090 +2.50    Age: [age]    Type: Progressive                POSTERIOR SEGMENT EXAM :  1%Mydriacyl 8/15/2022       Last Dilated       8/15/2022   OCT ON                2/9/2022   Last VF 24-2       8/15/2022   Last OCT-MAC     2/9/2022   Last Gonio 7/23/20  RIGHT EYE  grade 3 inferior samplesis,  LEFT EYE  grade 3  Pachs RIGHT EYE  544 LEFT EYE  556 on 3-7-19 normal BOTH EYES  
        ASSESSMENT/PLAN:  1. OLD VITREOUS DETACHMENT,  high myopia bilaterally  : Signs and symptoms of retinal detachment  were reviewed and she was instructed to call immediately  if they develop. No tears to depression today.     2. Mild extra foveal epiretinal membrane in the right eye  .   Observe     3. Nuclear sclerotic  Cataract , bilaterally left eye > right eye  :  She is not seeing as well but did not want refraction today to see uif glasses would help as she goes to her cousin and optom for refraction. She is to call if MR does not help . l     4. Family history of glaucoma Father and PGm. She  has Ocular HTN vs glaucoma . The VF , ON and OCT have all been normal and unchanged to 2018  I reviewed the familial association and the risk and advise annual exams. She has a first time high IOP 24/23 on 12-19-18 .today it is borderline . The OHTS study was reviewed along with options of treatment (eye drops, SLT, etc.) vs observation.  Treatment including drops, SLT and risks/benefits/alternatives of each were reviewed.  The patient wishes to continue monitoring for now.  I recommend at least annual OCT, VF, and dilated exam.  An AAO pamphlet on glaucoma was previously given and reviewed.  The importance of follow up and the risks of total permanent blindness which glaucoma can cause were reviewed.    5. High astigmatism+ topography showed that this is regular 2009. Stable     6. AODM- good control in past - higher than desired last A1C  : fortunately, there are no  signs of diabetic retinopathy today, 2-9-22. I advised her to follow her doctor's recommendations regarding cholesterol/BP/BS  control, cardiovascular screening, and anticoagulation.  I emphasized the risk of total permanent blindness as well as the  importance of annual dilated eye exams. The fact that the high BS can change the MR was emphasized.     7. General eye care was reviewed (sun protection/safety glasses) She had Pfizer covid vaccine 
1/26/21, 2/6/21, booster 11/23/21 Shingrix vaccine 10/23/19, 02/07/20         Thank you for allowing me to participate in your patient's care. Please call our office at  [phone] if you have any questions.    RTC 6 mo VF dil OCT mac   or immediately prn as instructed.     Send this note to her PCP    Chief Complaint   Patient presents with   • Eye Exam   • Office Visit     Here for a VF 24-2 and OCT-MAC     RH   Pfizer covid vaccine 1/26/21, 2/6/21, booster 11/23/21  Shingrix vaccine 10/23/19, 02/07/20     Technicians note reviewed 8/15/2022, Monica Hinkle MD.     HISTORY OF PRESENT ILLNESS: 67 year old female with a h/o AODM, HTN,   hypercholesteremia, old  PVDs (posterior vitreous detachments both eyes)  BOTH EYES, high myopia/astigmatism, glaucoma suspect based on ocular HTN,  mild epiretinal membrane (ERM) left and is here for a VF 24-2 and OCT-MAC. She says her vision is \"not what it used to be and she is looking through a fog\" but she isn ot interested in CE at this time.   She sees her cousin Dr. De La O who is an Optom for new glasses, had new glasses made this year, she does not want a refraction charge.   The patient denies having any new floaters, loss of side vision or flashes in either eye.    She does not check her BS at home at all. She switched PCPs and is having an MRI brain done 8/26/22 because she hears \"swishing\" in her head. (Pulsatile tinnitus).  She is seeing Dr. Duvall 8/25/22 for cardiac issues- she is hoping its anxiety, but to be sure will see cards as she feels a few \"extra beats\"     She has had arm pain and says she was put on po prednisone - reviewed note 11- \"suspect PMR\"  Trial of steroids - pt not on them now   Lab review 11- Hep C non reactive , ESR 15, CRP 22.8 (high normal is <10)     Blood Sugar = does not check       Last A1C#:   Hemoglobin A1C (%)   Date Value   07/27/2022 8.1 (H)     No results found for: [id number]  Blood Sugar:     Glucose (mg/dL) 
  Date Value   07/27/2022 145 (H)            SH:  Never a SMOKER and NO ALCOHOL CONSUMPTION, Rarely, no drugs    ROS, Mood, Affect:  A&O x 3, happy. See full ROS sheet patient completed and smart chart (I reviewed).  ROS:   General: No fevers, chills, sweats,   Skin: No rash, bruising, bleeding   Head:  no changes in hearing   Eyes: see above   Chest: No cough, SOB, chest pain, palpations   GI/: No abdominal pain, diarrhea, constipation, dysuria, increased frequency of urination, urgency to urinate,   Muscle/Skeletal: No fatigue   Psych: No anxiety, depression, thoughts of hurting self or others   Neuro: No LOC, numbness or tingling in extremities,   Endocrine: Denies weight changes, changes in thirst, chills, hot or cold intolerance    POH:  Shingrix 10-23-19, 02/07/20  Oculr HTN First time high IOP 23/24 on 12-19-18 glaucoma suspect , Positive FHX Glaucoma ( Father & Paternal Grandmother)  High myopia and astigmatism topography No DANNIELLE  first glasses 6 yo   RIGHT EYE  44.50, 49.12x84 regular on 10-31-12 topography   LEFT EYE  44.37, 48.37x 090 regular  cataracts BOTH EYES   PVD BOTH EYES  saw Leonor 2011   Mild Extrafoveal ERM RIGHT EYE , mild ERM LEFT EYE   Ptosis BOTH EYES  upper lids mild  Myopia BOTH EYES   Gets glasses at her cousin's [facility] O.D.   Denies LASIK PRK amblyopia eye trauma     PMH/PSH:    AODM 2016  HTN,   Shingles right forehead years ago     PVD (posterior vitreous detachment), both eyes                Cataracts, bilateral                                          Macular pucker, left eye                        11/13/2014    Lichen simplex chronicus                                      Type 2 diabetes mellitus without complication,* 12/18/2017    Hypertension                                    07/08/2020      Comment: Last Assessment & Plan:  Formatting of this                note might be different from the original.                Dyslipidemia, stable, on statins, no myalgias,                
tolerates meds well, reports no side effects.                Role of statins discussed, side effects                reviewed.    Glaucoma suspect of both eyes                   12/19/2018    Essential hypertension, benign                  12/14/2017    Dyslipidemia                                    12/14/2017    Hiatal hernia                                               Tonsillectomy, hysterectomy no cancer, Bladder Repair 1/15/2014 robot, breast bx benign 2018  Past Surgical History:   Procedure Laterality Date   • Biopsy of breast, incisional     • Bladder repair  01/15/2014   • Colonoscopy diagnostic  11/09/2007    Colonoscopy, Dx   • Cystourethroscopy  11/09/2007   • Laser ablation of condylomas     • Repair of rectocele     • Total abdom hysterectomy           PMD:  Jhonny Christensen MD    ALLERGIES:   Allergen Reactions   • Nitrofurantoin Macrocrystal SHORTNESS OF BREATH and PRURITUS     THROAT itches   • Promethazine Hcl Other (See Comments)     Pt could not function, could not move.  Felt very weird.  Told not to take again.   • Sulfa Antibiotics SHORTNESS OF BREATH   • Dolasetron Other (See Comments)     Patient states that she was \"unable to move\".   • Pain Relief VOMITING     Pertinent systemic meds: See EPIC for full med list  Was On Prednisone For 4-5 Days  ( Nov. 2016) For Severe Bronchitis/ Pneumonia    FH:  I reviewed the technicians history as outlined in EPIC.; there are no additions.    FAMILY OCULAR HX    Glaucoma: yes, Father & Paternal Grandmother    Retinal detachment: no    Macular degeneration:  no    Amblyopia (lazy eye), strabismus (ET, XT, etc.): no    Eye surgery: Cataract Surgery Paternal Grandmother    Other eye diseases: no    Eye meds: Systane PRN- has not used in a while       Exam:  Base Eye Exam     Visual Acuity (Snellen - Linear)       Right Left    Dist cc 20/30 -2 20/40 -1    Dist ph cc No Improvement No Improvement    Correction: Glasses          Tonometry (Applanation, 11:24 
AM)       Right Left    Pressure 23 25          Pupils       Pupils APD    Right PERRL Negative    Left PERRL Negative          Visual Fields (Counting fingers)       Left Right     Full Full          Extraocular Movement       Right Left     Full, Ortho Full, Ortho          Neuro/Psych     Oriented x3: Yes    Mood/Affect: Normal            Additional Notes    VISUAL FIELD TEST INTERPRETATION:   Suazo  VF 24-2    Patient Reliability:    Right eye:  good    Left eye:  good      Visual Field Findings:    Right eye:  normal Visual field and random, partial, nonspecific misses  Left eye:    normal Visual field and random, partial, nonspecific misses    OCT INTERPRETATION:  MACULA SCAN   CENTRAL MACULAR THICKNESS:    Signal strength  8/10  Right eye:  283 uM central thickness no edema  , mild non central ERM   Signal strength  7/10  Left eye:  287 uM central thickness no edema, mild non central ERM              Slit Lamp and Fundus Exam     External Exam       Right Left    External Dermatochalasis Dermatochalasis          Slit Lamp Exam       Right Left    Lids/Lashes Normal Normal    Conjunctiva/Sclera Clear Clear    Cornea Clear, rare pigment on endothelium  Clear, rare pigment on endothelium     Anterior Chamber Deep and quiet Deep and quiet    Iris Round and regular, No neovascularization or transilluminations defects   Round and regular, No neovascularization or transilluminations defects      Lens 2+ Nuclear sclerosis 2+ Nuclear sclerosis, some posterior thickening           Fundus Exam       Right Left    Vitreous Clear, Posterior vitreous detachment Clear, synuresis  ment    Disc Flat, pink with a healthy rim Flat, pink with a healthy rim    C/D Ratio 0.2 0.2    Macula Flat and dull, mild non center mauclar pucker , , No clinicically significant macular edema, microaneurysms, hemorrhage, cotton wool spots or exudates  Flat and dull, mild macular pucker temporally, faint ERM, No clinicically significant 
macular edema, microaneurysms, hemorrhage, cotton wool spots or exudates     Vessels Healthy with normal Artery-Vein ratio Healthy with normal Artery-Vein ratio    Periphery Flat, healthy, without tears or detachments to depression 360 Flat, healthy, without tears or detachments to depression 360            Refraction     Wearing Rx       Sphere Cylinder Axis Add    Right -8.00 +4.75 087 +2.50    Left -8.25 +4.25 090 +2.50    Age: [age]    Type: Progressive                POSTERIOR SEGMENT EXAM :  1%Mydriacyl 8/15/2022       Last Dilated       8/15/2022   OCT ON                2/9/2022   Last VF 24-2       8/15/2022   Last OCT-MAC     2/9/2022   Last Gonio 7/23/20  RIGHT EYE  grade 3 inferior samplesis,  LEFT EYE  grade 3  Pachs RIGHT EYE  544 LEFT EYE  556 on 3-7-19 normal BOTH EYES          ASSESSMENT/PLAN:  1. OLD VITREOUS DETACHMENT,  high myopia bilaterally  : Signs and symptoms of retinal detachment  were reviewed and she was instructed to call immediately  if they develop. No tears to depression today.     2. Mild extra foveal epiretinal membrane in the right eye  .   Observe     3. Nuclear sclerotic  Cataract , bilaterally left eye > right eye  :  She is not seeing as well but did not want refraction today to see uif glasses would help as she goes to her cousin and optom for refraction. She is to call if MR does not help . l     4. Ocular HTN = Family history of glaucoma Father and PGm. She  has Ocular HTN vs glaucoma . The VF , ON and OCT have all been normal and unchanged to 2018. The vf is well preserved.   I reviewed the familial association and the risk and advise annual exams. She has a first time high IOP 24/23 on 12-19-18 .today it is borderline . The OHTS study was reviewed along with options of treatment (eye drops, SLT, etc.) vs observation.  Treatment including drops, SLT and risks/benefits/alternatives of each were reviewed.  The patient wishes to continue monitoring for now.  I recommend at 
least annual OCT, VF, and dilated exam.  An AAO pamphlet on glaucoma was previously given and reviewed.  The importance of follow up and the risks of total permanent blindness which glaucoma can cause were reviewed.    5. High astigmatism+ topography showed that this is regular 2009. Stable     6. AODM- good control in past -last A1C was 8.  : fortunately, there are no  signs of diabetic retinopathy today,8-15-22  I advised her to follow her doctor's recommendations regarding cholesterol/BP/BS  control, cardiovascular screening, and anticoagulation.  I emphasized the risk of total permanent blindness as well as the  importance of annual dilated eye exams. The fact that the high BS can change the MR was emphasized.     7. General eye care was reviewed (sun protection/safety glasses) She had Pfizer covid vaccine 1/26/21, 2/6/21, booster 11/23/21 Shingrix vaccine 10/23/19, 02/07/20         Thank you for allowing me to participate in your patient's care. Please call our office at  [phone] if you have any questions.    RTC 6 mo dil OCT mac  And ON after 2-9-23 or immediately prn as instructed.     pcp note 8-15-22        
[email or web address]
Patient

## 2023-05-19 NOTE — OB PROVIDER TRIAGE NOTE - NSHPPHYSICALEXAM_GEN_ALL_CORE
PE:    T(C): 36.9 (05-19-23 @ 19:58), Max: 36.9 (05-19-23 @ 19:58)  HR: 121 (05-19-23 @ 20:05) (102 - 121)  BP: 187/126 (05-19-23 @ 19:58) (187/126 - 187/126)  RR: 18 (05-19-23 @ 19:58) (18 - 18)  SpO2: 99% (05-19-23 @ 20:05) (92% - 100%)    General: NAD, A&Ox3  CV: RRR  Lungs: Clear bilat   Abd: soft, nontender, gravid  VE: deferred  EFM: 145/moderate variablity/+accels/-decels  TOCO: none

## 2023-05-19 NOTE — OB PROVIDER H&P - NSHPPHYSICALEXAM_GEN_ALL_CORE
PE:  T(C): 36.9 (05-19-23 @ 20:29), Max: 36.9 (05-19-23 @ 19:58)  HR: 96 (05-19-23 @ 20:40) (77 - 121)  BP: 159/92 (05-19-23 @ 20:32) (159/92 - 193/106)  RR: 18 (05-19-23 @ 20:29) (18 - 18)  SpO2: 99% (05-19-23 @ 20:40) (92% - 100%)    General: NAD, A&Ox3  CV: RRR  Lungs: Clear bilat   Abd: soft, nontender, gravid  VE: deferred  EFM: 145/moderate variablity/+accels/-decels  TOCO: none PE:  T(C): 36.9 (05-19-23 @ 20:29), Max: 36.9 (05-19-23 @ 19:58)  HR: 96 (05-19-23 @ 20:40) (77 - 121)  BP: 159/92 (05-19-23 @ 20:32) (159/92 - 193/106)  RR: 18 (05-19-23 @ 20:29) (18 - 18)  SpO2: 99% (05-19-23 @ 20:40) (92% - 100%)    General: NAD, A&Ox3  CV: RRR  Lungs: Clear bilat   Abd: soft, nontender, gravid  VE: c/l/h  EFM: 145/moderate variablity/+accels/-decels  TOCO: none

## 2023-05-19 NOTE — OB PROVIDER H&P - ASSESSMENT
A/P: 33yo G1 @ 34w3d here for IOL 2/2 sPEC/Mg; received Labetalol 20IVP and Procardia 30XL qd  - Admit to L&D  - Routine labs   - EFM/TOCO: resuscitative measures  - HELLP labs pending; continue to monitor BPs  - PO Cytotec as IOL agent  - Anesthesia consult for epidural prn  - Expect       d/w Dr. Minal Bishop, PA-C

## 2023-05-19 NOTE — OB PROVIDER H&P - NSLOWPPHRISK_OBGYN_A_OB
No previous uterine incision/Ferrara Pregnancy/Less than or equal to 4 previous vaginal births/No known bleeding disorder/No history of postpartum hemorrhage/No other PPH risks indicated

## 2023-05-19 NOTE — OB PROVIDER TRIAGE NOTE - NSOBPROVIDERNOTE_OBGYN_ALL_OB_FT
A/P: 33yo G1 @ 34w3d here for r/o PEC  -eBPs: HELLP labs ordered  -1 severe range pressure; awaiting repeat   -IV lock  -will f/u labs to determine plan of care    will d/w Dr. Mccarty once labs results    SHADI TaylorC A/P: 31yo G1 @ 34w3d here for r/o PEC  -eBPs: HELLP labs ordered  -1 severe range pressure; awaiting repeat   -IV lock  -will f/u labs to determine plan of care    will d/w Dr. Fontaine once labs results    Brianne Bishop PA-C

## 2023-05-20 LAB
ALBUMIN SERPL ELPH-MCNC: 3.1 G/DL — LOW (ref 3.3–5)
ALBUMIN SERPL ELPH-MCNC: 3.5 G/DL — SIGNIFICANT CHANGE UP (ref 3.3–5)
ALBUMIN SERPL ELPH-MCNC: 3.8 G/DL — SIGNIFICANT CHANGE UP (ref 3.3–5)
ALP SERPL-CCNC: 177 U/L — HIGH (ref 40–120)
ALP SERPL-CCNC: 186 U/L — HIGH (ref 40–120)
ALP SERPL-CCNC: 202 U/L — HIGH (ref 40–120)
ALT FLD-CCNC: 55 U/L — HIGH (ref 10–45)
ALT FLD-CCNC: 56 U/L — HIGH (ref 10–45)
ALT FLD-CCNC: 67 U/L — HIGH (ref 10–45)
ANION GAP SERPL CALC-SCNC: 15 MMOL/L — SIGNIFICANT CHANGE UP (ref 5–17)
ANION GAP SERPL CALC-SCNC: 15 MMOL/L — SIGNIFICANT CHANGE UP (ref 5–17)
ANION GAP SERPL CALC-SCNC: 16 MMOL/L — SIGNIFICANT CHANGE UP (ref 5–17)
APTT BLD: 26.7 SEC — LOW (ref 27.5–35.5)
APTT BLD: 28.3 SEC — SIGNIFICANT CHANGE UP (ref 27.5–35.5)
APTT BLD: 28.7 SEC — SIGNIFICANT CHANGE UP (ref 27.5–35.5)
AST SERPL-CCNC: 60 U/L — HIGH (ref 10–40)
AST SERPL-CCNC: 64 U/L — HIGH (ref 10–40)
AST SERPL-CCNC: 79 U/L — HIGH (ref 10–40)
BASOPHILS # BLD AUTO: 0.08 K/UL — SIGNIFICANT CHANGE UP (ref 0–0.2)
BASOPHILS # BLD AUTO: 0.09 K/UL — SIGNIFICANT CHANGE UP (ref 0–0.2)
BASOPHILS # BLD AUTO: 0.1 K/UL — SIGNIFICANT CHANGE UP (ref 0–0.2)
BASOPHILS # BLD AUTO: 0.1 K/UL — SIGNIFICANT CHANGE UP (ref 0–0.2)
BASOPHILS NFR BLD AUTO: 0.4 % — SIGNIFICANT CHANGE UP (ref 0–2)
BASOPHILS NFR BLD AUTO: 0.6 % — SIGNIFICANT CHANGE UP (ref 0–2)
BASOPHILS NFR BLD AUTO: 0.7 % — SIGNIFICANT CHANGE UP (ref 0–2)
BASOPHILS NFR BLD AUTO: 0.7 % — SIGNIFICANT CHANGE UP (ref 0–2)
BILIRUB SERPL-MCNC: 0.4 MG/DL — SIGNIFICANT CHANGE UP (ref 0.2–1.2)
BILIRUB SERPL-MCNC: 0.5 MG/DL — SIGNIFICANT CHANGE UP (ref 0.2–1.2)
BILIRUB SERPL-MCNC: 0.5 MG/DL — SIGNIFICANT CHANGE UP (ref 0.2–1.2)
BUN SERPL-MCNC: 10 MG/DL — SIGNIFICANT CHANGE UP (ref 7–23)
CALCIUM SERPL-MCNC: 6.6 MG/DL — LOW (ref 8.4–10.5)
CALCIUM SERPL-MCNC: 7.3 MG/DL — LOW (ref 8.4–10.5)
CALCIUM SERPL-MCNC: 8.4 MG/DL — SIGNIFICANT CHANGE UP (ref 8.4–10.5)
CHLORIDE SERPL-SCNC: 101 MMOL/L — SIGNIFICANT CHANGE UP (ref 96–108)
CHLORIDE SERPL-SCNC: 102 MMOL/L — SIGNIFICANT CHANGE UP (ref 96–108)
CHLORIDE SERPL-SCNC: 98 MMOL/L — SIGNIFICANT CHANGE UP (ref 96–108)
CO2 SERPL-SCNC: 18 MMOL/L — LOW (ref 22–31)
CO2 SERPL-SCNC: 18 MMOL/L — LOW (ref 22–31)
CO2 SERPL-SCNC: 21 MMOL/L — LOW (ref 22–31)
COVID-19 SPIKE DOMAIN AB INTERP: POSITIVE
COVID-19 SPIKE DOMAIN ANTIBODY RESULT: >250 U/ML — HIGH
CREAT SERPL-MCNC: 0.9 MG/DL — SIGNIFICANT CHANGE UP (ref 0.5–1.3)
CREAT SERPL-MCNC: 0.92 MG/DL — SIGNIFICANT CHANGE UP (ref 0.5–1.3)
CREAT SERPL-MCNC: 0.93 MG/DL — SIGNIFICANT CHANGE UP (ref 0.5–1.3)
EGFR: 83 ML/MIN/1.73M2 — SIGNIFICANT CHANGE UP
EGFR: 84 ML/MIN/1.73M2 — SIGNIFICANT CHANGE UP
EGFR: 87 ML/MIN/1.73M2 — SIGNIFICANT CHANGE UP
EOSINOPHIL # BLD AUTO: 0.02 K/UL — SIGNIFICANT CHANGE UP (ref 0–0.5)
EOSINOPHIL # BLD AUTO: 0.04 K/UL — SIGNIFICANT CHANGE UP (ref 0–0.5)
EOSINOPHIL # BLD AUTO: 0.05 K/UL — SIGNIFICANT CHANGE UP (ref 0–0.5)
EOSINOPHIL # BLD AUTO: 0.06 K/UL — SIGNIFICANT CHANGE UP (ref 0–0.5)
EOSINOPHIL NFR BLD AUTO: 0.1 % — SIGNIFICANT CHANGE UP (ref 0–6)
EOSINOPHIL NFR BLD AUTO: 0.3 % — SIGNIFICANT CHANGE UP (ref 0–6)
EOSINOPHIL NFR BLD AUTO: 0.4 % — SIGNIFICANT CHANGE UP (ref 0–6)
EOSINOPHIL NFR BLD AUTO: 0.4 % — SIGNIFICANT CHANGE UP (ref 0–6)
FIBRINOGEN PPP-MCNC: 490 MG/DL — HIGH (ref 200–445)
FIBRINOGEN PPP-MCNC: 536 MG/DL — HIGH (ref 200–445)
FIBRINOGEN PPP-MCNC: 540 MG/DL — HIGH (ref 200–445)
GLUCOSE BLDC GLUCOMTR-MCNC: 103 MG/DL — HIGH (ref 70–99)
GLUCOSE BLDC GLUCOMTR-MCNC: 104 MG/DL — HIGH (ref 70–99)
GLUCOSE BLDC GLUCOMTR-MCNC: 89 MG/DL — SIGNIFICANT CHANGE UP (ref 70–99)
GLUCOSE BLDC GLUCOMTR-MCNC: 89 MG/DL — SIGNIFICANT CHANGE UP (ref 70–99)
GLUCOSE BLDC GLUCOMTR-MCNC: 93 MG/DL — SIGNIFICANT CHANGE UP (ref 70–99)
GLUCOSE SERPL-MCNC: 114 MG/DL — HIGH (ref 70–99)
GLUCOSE SERPL-MCNC: 88 MG/DL — SIGNIFICANT CHANGE UP (ref 70–99)
GLUCOSE SERPL-MCNC: 94 MG/DL — SIGNIFICANT CHANGE UP (ref 70–99)
HCT VFR BLD CALC: 41.1 % — SIGNIFICANT CHANGE UP (ref 34.5–45)
HCT VFR BLD CALC: 41.4 % — SIGNIFICANT CHANGE UP (ref 34.5–45)
HCT VFR BLD CALC: 44.7 % — SIGNIFICANT CHANGE UP (ref 34.5–45)
HCT VFR BLD CALC: 45.1 % — HIGH (ref 34.5–45)
HGB BLD-MCNC: 13.7 G/DL — SIGNIFICANT CHANGE UP (ref 11.5–15.5)
HGB BLD-MCNC: 14.1 G/DL — SIGNIFICANT CHANGE UP (ref 11.5–15.5)
HGB BLD-MCNC: 15.3 G/DL — SIGNIFICANT CHANGE UP (ref 11.5–15.5)
HGB BLD-MCNC: 15.3 G/DL — SIGNIFICANT CHANGE UP (ref 11.5–15.5)
IMM GRANULOCYTES NFR BLD AUTO: 0.6 % — SIGNIFICANT CHANGE UP (ref 0–0.9)
IMM GRANULOCYTES NFR BLD AUTO: 0.7 % — SIGNIFICANT CHANGE UP (ref 0–0.9)
IMM GRANULOCYTES NFR BLD AUTO: 0.8 % — SIGNIFICANT CHANGE UP (ref 0–0.9)
IMM GRANULOCYTES NFR BLD AUTO: 1 % — HIGH (ref 0–0.9)
INR BLD: 0.92 RATIO — SIGNIFICANT CHANGE UP (ref 0.88–1.16)
INR BLD: 0.94 RATIO — SIGNIFICANT CHANGE UP (ref 0.88–1.16)
INR BLD: 0.95 RATIO — SIGNIFICANT CHANGE UP (ref 0.88–1.16)
LDH SERPL L TO P-CCNC: 233 U/L — SIGNIFICANT CHANGE UP (ref 50–242)
LDH SERPL L TO P-CCNC: 271 U/L — HIGH (ref 50–242)
LDH SERPL L TO P-CCNC: 281 U/L — HIGH (ref 50–242)
LYMPHOCYTES # BLD AUTO: 1.58 K/UL — SIGNIFICANT CHANGE UP (ref 1–3.3)
LYMPHOCYTES # BLD AUTO: 1.83 K/UL — SIGNIFICANT CHANGE UP (ref 1–3.3)
LYMPHOCYTES # BLD AUTO: 12.8 % — LOW (ref 13–44)
LYMPHOCYTES # BLD AUTO: 16.7 % — SIGNIFICANT CHANGE UP (ref 13–44)
LYMPHOCYTES # BLD AUTO: 2.23 K/UL — SIGNIFICANT CHANGE UP (ref 1–3.3)
LYMPHOCYTES # BLD AUTO: 21.3 % — SIGNIFICANT CHANGE UP (ref 13–44)
LYMPHOCYTES # BLD AUTO: 3.43 K/UL — HIGH (ref 1–3.3)
LYMPHOCYTES # BLD AUTO: 7.4 % — LOW (ref 13–44)
MAGNESIUM SERPL-MCNC: 6 MG/DL — HIGH (ref 1.6–2.6)
MAGNESIUM SERPL-MCNC: 6.3 MG/DL — HIGH (ref 1.6–2.6)
MAGNESIUM SERPL-MCNC: 7.7 MG/DL — HIGH (ref 1.6–2.6)
MAGNESIUM SERPL-MCNC: 8.9 MG/DL — HIGH (ref 1.6–2.6)
MCHC RBC-ENTMCNC: 30.2 PG — SIGNIFICANT CHANGE UP (ref 27–34)
MCHC RBC-ENTMCNC: 30.3 PG — SIGNIFICANT CHANGE UP (ref 27–34)
MCHC RBC-ENTMCNC: 30.5 PG — SIGNIFICANT CHANGE UP (ref 27–34)
MCHC RBC-ENTMCNC: 30.5 PG — SIGNIFICANT CHANGE UP (ref 27–34)
MCHC RBC-ENTMCNC: 33.3 GM/DL — SIGNIFICANT CHANGE UP (ref 32–36)
MCHC RBC-ENTMCNC: 33.9 GM/DL — SIGNIFICANT CHANGE UP (ref 32–36)
MCHC RBC-ENTMCNC: 34.1 GM/DL — SIGNIFICANT CHANGE UP (ref 32–36)
MCHC RBC-ENTMCNC: 34.2 GM/DL — SIGNIFICANT CHANGE UP (ref 32–36)
MCV RBC AUTO: 89.2 FL — SIGNIFICANT CHANGE UP (ref 80–100)
MCV RBC AUTO: 89.3 FL — SIGNIFICANT CHANGE UP (ref 80–100)
MCV RBC AUTO: 89.4 FL — SIGNIFICANT CHANGE UP (ref 80–100)
MCV RBC AUTO: 90.7 FL — SIGNIFICANT CHANGE UP (ref 80–100)
MONOCYTES # BLD AUTO: 0.63 K/UL — SIGNIFICANT CHANGE UP (ref 0–0.9)
MONOCYTES # BLD AUTO: 0.64 K/UL — SIGNIFICANT CHANGE UP (ref 0–0.9)
MONOCYTES # BLD AUTO: 0.69 K/UL — SIGNIFICANT CHANGE UP (ref 0–0.9)
MONOCYTES # BLD AUTO: 0.94 K/UL — HIGH (ref 0–0.9)
MONOCYTES NFR BLD AUTO: 2.9 % — SIGNIFICANT CHANGE UP (ref 2–14)
MONOCYTES NFR BLD AUTO: 4.5 % — SIGNIFICANT CHANGE UP (ref 2–14)
MONOCYTES NFR BLD AUTO: 5.2 % — SIGNIFICANT CHANGE UP (ref 2–14)
MONOCYTES NFR BLD AUTO: 5.8 % — SIGNIFICANT CHANGE UP (ref 2–14)
NEUTROPHILS # BLD AUTO: 10.2 K/UL — HIGH (ref 1.8–7.4)
NEUTROPHILS # BLD AUTO: 11.4 K/UL — HIGH (ref 1.8–7.4)
NEUTROPHILS # BLD AUTO: 11.62 K/UL — HIGH (ref 1.8–7.4)
NEUTROPHILS # BLD AUTO: 19.04 K/UL — HIGH (ref 1.8–7.4)
NEUTROPHILS NFR BLD AUTO: 70.9 % — SIGNIFICANT CHANGE UP (ref 43–77)
NEUTROPHILS NFR BLD AUTO: 76.3 % — SIGNIFICANT CHANGE UP (ref 43–77)
NEUTROPHILS NFR BLD AUTO: 80.9 % — HIGH (ref 43–77)
NEUTROPHILS NFR BLD AUTO: 88.6 % — HIGH (ref 43–77)
NRBC # BLD: 0 /100 WBCS — SIGNIFICANT CHANGE UP (ref 0–0)
PLATELET # BLD AUTO: 272 K/UL — SIGNIFICANT CHANGE UP (ref 150–400)
PLATELET # BLD AUTO: 284 K/UL — SIGNIFICANT CHANGE UP (ref 150–400)
PLATELET # BLD AUTO: 286 K/UL — SIGNIFICANT CHANGE UP (ref 150–400)
PLATELET # BLD AUTO: 294 K/UL — SIGNIFICANT CHANGE UP (ref 150–400)
POTASSIUM SERPL-MCNC: 4.1 MMOL/L — SIGNIFICANT CHANGE UP (ref 3.5–5.3)
POTASSIUM SERPL-MCNC: 4.3 MMOL/L — SIGNIFICANT CHANGE UP (ref 3.5–5.3)
POTASSIUM SERPL-MCNC: 4.4 MMOL/L — SIGNIFICANT CHANGE UP (ref 3.5–5.3)
POTASSIUM SERPL-SCNC: 4.1 MMOL/L — SIGNIFICANT CHANGE UP (ref 3.5–5.3)
POTASSIUM SERPL-SCNC: 4.3 MMOL/L — SIGNIFICANT CHANGE UP (ref 3.5–5.3)
POTASSIUM SERPL-SCNC: 4.4 MMOL/L — SIGNIFICANT CHANGE UP (ref 3.5–5.3)
PROT ?TM UR-MCNC: 8 MG/DL — SIGNIFICANT CHANGE UP (ref 0–12)
PROT SERPL-MCNC: 6.6 G/DL — SIGNIFICANT CHANGE UP (ref 6–8.3)
PROT SERPL-MCNC: 6.8 G/DL — SIGNIFICANT CHANGE UP (ref 6–8.3)
PROT SERPL-MCNC: 7.6 G/DL — SIGNIFICANT CHANGE UP (ref 6–8.3)
PROTHROM AB SERPL-ACNC: 10.6 SEC — SIGNIFICANT CHANGE UP (ref 10.5–13.4)
PROTHROM AB SERPL-ACNC: 10.9 SEC — SIGNIFICANT CHANGE UP (ref 10.5–13.4)
PROTHROM AB SERPL-ACNC: 10.9 SEC — SIGNIFICANT CHANGE UP (ref 10.5–13.4)
RBC # BLD: 4.53 M/UL — SIGNIFICANT CHANGE UP (ref 3.8–5.2)
RBC # BLD: 4.63 M/UL — SIGNIFICANT CHANGE UP (ref 3.8–5.2)
RBC # BLD: 5.01 M/UL — SIGNIFICANT CHANGE UP (ref 3.8–5.2)
RBC # BLD: 5.05 M/UL — SIGNIFICANT CHANGE UP (ref 3.8–5.2)
RBC # FLD: 13.8 % — SIGNIFICANT CHANGE UP (ref 10.3–14.5)
RBC # FLD: 13.9 % — SIGNIFICANT CHANGE UP (ref 10.3–14.5)
RBC # FLD: 13.9 % — SIGNIFICANT CHANGE UP (ref 10.3–14.5)
RBC # FLD: 14 % — SIGNIFICANT CHANGE UP (ref 10.3–14.5)
SARS-COV-2 IGG+IGM SERPL QL IA: >250 U/ML — HIGH
SARS-COV-2 IGG+IGM SERPL QL IA: POSITIVE
SODIUM SERPL-SCNC: 134 MMOL/L — LOW (ref 135–145)
SODIUM SERPL-SCNC: 134 MMOL/L — LOW (ref 135–145)
SODIUM SERPL-SCNC: 136 MMOL/L — SIGNIFICANT CHANGE UP (ref 135–145)
T PALLIDUM AB TITR SER: NEGATIVE — SIGNIFICANT CHANGE UP
URATE SERPL-MCNC: 8.4 MG/DL — HIGH (ref 2.5–7)
URATE SERPL-MCNC: 9.4 MG/DL — HIGH (ref 2.5–7)
WBC # BLD: 13.36 K/UL — HIGH (ref 3.8–10.5)
WBC # BLD: 14.35 K/UL — HIGH (ref 3.8–10.5)
WBC # BLD: 16.09 K/UL — HIGH (ref 3.8–10.5)
WBC # BLD: 21.48 K/UL — HIGH (ref 3.8–10.5)
WBC # FLD AUTO: 13.36 K/UL — HIGH (ref 3.8–10.5)
WBC # FLD AUTO: 14.35 K/UL — HIGH (ref 3.8–10.5)
WBC # FLD AUTO: 16.09 K/UL — HIGH (ref 3.8–10.5)
WBC # FLD AUTO: 21.48 K/UL — HIGH (ref 3.8–10.5)

## 2023-05-20 PROCEDURE — 88307 TISSUE EXAM BY PATHOLOGIST: CPT | Mod: 26

## 2023-05-20 RX ORDER — TETANUS TOXOID, REDUCED DIPHTHERIA TOXOID AND ACELLULAR PERTUSSIS VACCINE, ADSORBED 5; 2.5; 8; 8; 2.5 [IU]/.5ML; [IU]/.5ML; UG/.5ML; UG/.5ML; UG/.5ML
0.5 SUSPENSION INTRAMUSCULAR ONCE
Refills: 0 | Status: DISCONTINUED | OUTPATIENT
Start: 2023-05-20 | End: 2023-05-24

## 2023-05-20 RX ORDER — MAGNESIUM SULFATE 500 MG/ML
1 VIAL (ML) INJECTION
Qty: 40 | Refills: 0 | Status: DISCONTINUED | OUTPATIENT
Start: 2023-05-20 | End: 2023-05-22

## 2023-05-20 RX ORDER — LANOLIN
1 OINTMENT (GRAM) TOPICAL EVERY 6 HOURS
Refills: 0 | Status: DISCONTINUED | OUTPATIENT
Start: 2023-05-20 | End: 2023-05-24

## 2023-05-20 RX ORDER — MORPHINE SULFATE 50 MG/1
0.1 CAPSULE, EXTENDED RELEASE ORAL ONCE
Refills: 0 | Status: DISCONTINUED | OUTPATIENT
Start: 2023-05-20 | End: 2023-05-21

## 2023-05-20 RX ORDER — OXYCODONE HYDROCHLORIDE 5 MG/1
5 TABLET ORAL ONCE
Refills: 0 | Status: DISCONTINUED | OUTPATIENT
Start: 2023-05-20 | End: 2023-05-24

## 2023-05-20 RX ORDER — OXYTOCIN 10 UNIT/ML
333.33 VIAL (ML) INJECTION
Qty: 20 | Refills: 0 | Status: DISCONTINUED | OUTPATIENT
Start: 2023-05-20 | End: 2023-05-24

## 2023-05-20 RX ORDER — SODIUM CHLORIDE 9 MG/ML
1000 INJECTION, SOLUTION INTRAVENOUS
Refills: 0 | Status: DISCONTINUED | OUTPATIENT
Start: 2023-05-20 | End: 2023-05-21

## 2023-05-20 RX ORDER — ACETAMINOPHEN 500 MG
975 TABLET ORAL
Refills: 0 | Status: DISCONTINUED | OUTPATIENT
Start: 2023-05-20 | End: 2023-05-24

## 2023-05-20 RX ORDER — MAGNESIUM HYDROXIDE 400 MG/1
30 TABLET, CHEWABLE ORAL
Refills: 0 | Status: DISCONTINUED | OUTPATIENT
Start: 2023-05-20 | End: 2023-05-24

## 2023-05-20 RX ORDER — SODIUM CHLORIDE 9 MG/ML
500 INJECTION INTRAMUSCULAR; INTRAVENOUS; SUBCUTANEOUS ONCE
Refills: 0 | Status: DISCONTINUED | OUTPATIENT
Start: 2023-05-20 | End: 2023-05-21

## 2023-05-20 RX ORDER — SIMETHICONE 80 MG/1
80 TABLET, CHEWABLE ORAL EVERY 4 HOURS
Refills: 0 | Status: DISCONTINUED | OUTPATIENT
Start: 2023-05-20 | End: 2023-05-24

## 2023-05-20 RX ORDER — OXYCODONE HYDROCHLORIDE 5 MG/1
5 TABLET ORAL
Refills: 0 | Status: DISCONTINUED | OUTPATIENT
Start: 2023-05-20 | End: 2023-05-24

## 2023-05-20 RX ORDER — IBUPROFEN 200 MG
600 TABLET ORAL EVERY 6 HOURS
Refills: 0 | Status: COMPLETED | OUTPATIENT
Start: 2023-05-20 | End: 2024-04-17

## 2023-05-20 RX ORDER — KETOROLAC TROMETHAMINE 30 MG/ML
30 SYRINGE (ML) INJECTION EVERY 6 HOURS
Refills: 0 | Status: DISCONTINUED | OUTPATIENT
Start: 2023-05-20 | End: 2023-05-21

## 2023-05-20 RX ORDER — SODIUM CHLORIDE 9 MG/ML
1000 INJECTION, SOLUTION INTRAVENOUS
Refills: 0 | Status: DISCONTINUED | OUTPATIENT
Start: 2023-05-20 | End: 2023-05-24

## 2023-05-20 RX ORDER — SODIUM CHLORIDE 9 MG/ML
1000 INJECTION INTRAMUSCULAR; INTRAVENOUS; SUBCUTANEOUS
Refills: 0 | Status: DISCONTINUED | OUTPATIENT
Start: 2023-05-20 | End: 2023-05-21

## 2023-05-20 RX ORDER — DIPHENHYDRAMINE HCL 50 MG
25 CAPSULE ORAL EVERY 6 HOURS
Refills: 0 | Status: DISCONTINUED | OUTPATIENT
Start: 2023-05-20 | End: 2023-05-24

## 2023-05-20 RX ADMIN — Medication 25 MICROGRAM(S): at 09:53

## 2023-05-20 RX ADMIN — Medication 108 GRAM(S): at 01:50

## 2023-05-20 RX ADMIN — Medication 108 GRAM(S): at 09:52

## 2023-05-20 RX ADMIN — Medication 50 GM/HR: at 21:07

## 2023-05-20 RX ADMIN — Medication 108 GRAM(S): at 17:55

## 2023-05-20 RX ADMIN — Medication 108 GRAM(S): at 13:52

## 2023-05-20 RX ADMIN — Medication 975 MILLIGRAM(S): at 23:05

## 2023-05-20 RX ADMIN — Medication 108 GRAM(S): at 05:52

## 2023-05-20 RX ADMIN — Medication 30 MILLIGRAM(S): at 21:11

## 2023-05-20 NOTE — OB RN DELIVERY SUMMARY - NS_SEPSISRSKCALC_OBGYN_ALL_OB_FT
EOS calculated successfully. EOS Risk Factor: 0.5/1000 live births ([facility] national incidence); GA=34w4d; Temp=98.4; ROM=2.883; GBS='Unknown'; Antibiotics='GBS specific antibiotics > 2 hrs prior to birth'

## 2023-05-20 NOTE — OB RN INTRAOPERATIVE NOTE - NS_INTERMITTENTPNEUMATICCOMPRESSIONSTART_OBGYN_ALL_OB_DT
2+ months of L MT-P joint L Thumb. Now getting worse. Pain when moves and recently increased swelling. No known trauma.
20-May-2023 18:58

## 2023-05-20 NOTE — OB PROVIDER LABOR PROGRESS NOTE - ASSESSMENT
EFM + [place] cat 1   Continue Mg   HELLP Q6hrs - trend LFT  Cont procardia 60  Switch to Buccal cytotec --> 2 doses --> Exam for possible AROM/PIT    [id number]
EFM + [place] cat 2   Cont Mg  Cont Procardia  Monitor VS  Anesthesia/ nursing notified   Proceed with  section    ny
P0 IOL for sPEC, patient with cat 2 tracing  - s/p AROM, will resuscitate and will start pit after 30 min cat 1 tracing  - Mg level sent  - Monitor BPs    D/w Dr. Resendez by ANASTACIA Constantino PGY1      Update:  - Mg 8.9. Pause for 2 hours. Restart at 6:45 pm @1. Continue q6h Mg levels

## 2023-05-20 NOTE — OB RN DELIVERY SUMMARY - NS_FETALDEATH_OBGYN_ALL_OB_NU
Decompensated HF in setting of medication non-adherence. Patient reports frequent urination as cause of not taking furosemide.    Trop leak most likely secondary to CHF.   c/w  furosemide 40mg IV qdaily  F/u cardiology recs.  -daily weights  -monitor I's&O's  -fluid restriction  f/u TTE  -c/w losartan 100mg daily, metoprolol 200mg ER daily  imdur increased to 60mg qdaily as per cardiology acute on chronic diastolic HF  Decompensated HF in setting of medication non-adherence. Patient reports frequent urination as cause of not taking furosemide.    Trop leak most likely secondary to CHF.   c/w  furosemide 40mg IV qdaily  F/u cardiology recs.  -daily weights  -monitor I's&O's  -fluid restriction  f/u TTE  -c/w losartan 100mg daily, metoprolol 200mg ER daily  imdur increased to 60mg qdaily as per cardiology 0

## 2023-05-20 NOTE — OB PROVIDER LABOR PROGRESS NOTE - NS_SUBJECTIVE/OBJECTIVE_OBGYN_ALL_OB_FT
Pt evaluated at bedside for persistent cat II tracing with late decelerations. Pitocin was held. Pt remote from delivery and a sPEC on Mg. Recommended  at this time. Discussed with pt. Pt agrees with plan to proceed with delivery.
Delayed charting 2/2 patient care.    Patient seen with NP MOARLES Bautista for decelerations and discontinuous tracing. She is not feeling well - reports tried to stand up but felt groggy. Magnesium infusion paused. Patient examined, 1.5/60/-3. Head well-applied. Discussed AROM/ISE and patient agrees to proceed.    AROM to clear, ISE placed. Patient repositioned.
Pt evaluated at bedside.

## 2023-05-20 NOTE — OB RN INTRAOPERATIVE NOTE - NS_ELECTROCAUTCUT_OBGYN_ALL_OB_FT
Review of Systems/Medical History          Cardiovascular  EKG reviewed, Exercise tolerance (METS): >4,  Hyperlipidemia, Hypertension , CAD , Dysrhythmias , history of PSVT, Aortic disease (TAA),   Comment: 11/19  EF-55%  Cardiac Cath -No Sig  Disease,  Pulmonary  Not a smoker , Asthma , well controlled/ stable , Sleep apnea CPAP,        GI/Hepatic    GERD , Esophageal disease haider esophagus,             Endo/Other    Obesity  morbid obesity   GYN       Hematology   Musculoskeletal       Neurology    CVA ,    Psychology           Physical Exam    Airway    Mallampati score: I  TM Distance: >3 FB  Neck ROM: full     Dental   Comment: [place],     Cardiovascular      Pulmonary      Other Findings        Anesthesia Plan  ASA Score- 3     Anesthesia Type- general with ASA Monitors  Additional Monitors:   Airway Plan: ETT  Plan Factors-Patient not instructed to abstain from smoking on day of procedure  Patient did not smoke on day of surgery  Induction- intravenous  Postoperative Plan-     Informed Consent- Anesthetic plan and risks discussed with patient  I personally reviewed this patient with the CRNA  Discussed and agreed on the Anesthesia Plan with the CRNA               Lab Results   Component Value Date    GLUC 107 11/14/2019    GLUF 107 (H) 11/29/2019    ALT 52 11/13/2019    AST 28 11/13/2019    BUN 9 11/29/2019    CALCIUM 9 6 11/29/2019     11/29/2019    CHOL 180 12/10/2016    CO2 30 11/29/2019    CREATININE 0 87 11/29/2019    HDL 38 (L) 03/27/2019    HCT 43 8 11/14/2019    HGB 14 6 11/14/2019    PROT 7 1 12/10/2016    HGBA1C 5 8 03/27/2019    MG 2 4 11/29/2019     11/14/2019    K 3 8 11/29/2019     12/10/2016    TRIG 94 03/27/2019    WBC 5 36 11/14/2019 40

## 2023-05-20 NOTE — OB RN DELIVERY SUMMARY - NSSELHIDDEN_OBGYN_ALL_OB_FT
[NS_DeliveryAttending1_OBGYN_ALL_OB_FT:[id number]],[NS_DeliveryAssist1_OBGYN_ALL_OB_FT:[id number]=],[NS_DeliveryRN_OBGYN_ALL_OB_FT:[id number]=]

## 2023-05-20 NOTE — OB NEONATOLOGY/PEDIATRICIAN DELIVERY SUMMARY - NSPEDSNEONOTESA_OBGYN_ALL_OB_FT
Requested by OB to attend this  delivery at 34.4 weeks for unscheduled  for category II tracing. Mother is a 33 year old,  , blood type  0  pos.  Prenatal labs as follow: HIV neg, RPR non-reactive, rubella immune, HBsA neg, GBS unknown treated with ampicillin x5. Maternal history significant for hypothyroidism on synthroid. Prenatal history significant for IOL for preeclampsia and GDM diet controlled. She received labetalol and Mg intrapartum. ROM 3 hours prior to delivery with clear fluid.  Infant emerged vertex, non-vigorous, with poor tone and no cry. Brought to warmer, dried, suctioned and stimulated. Continued to be apneic so received PPV 20/5 x 30 seconds with improvement in respiratory effort. HR > 100 throughout. Transitioned to CPAP5, max FiO2 40%. Respiratory effort, tone and color improved with CPAP. Transported to the NICU on CPAP5 21%. Apgars 5/8. Mom wishes to breast and bottle feed. Declines hep B vaccine. Consents to circumcision. Infant admitted to NICU for further management of care. Parents updated.

## 2023-05-20 NOTE — OB RN DELIVERY SUMMARY - NS_BREASTACTIONA_OBGYN_ALL_OB
Call placed to preferred number re wound care referral.  No answer.  Message left re nature of call and contact info to return my call.  
No action was needed

## 2023-05-20 NOTE — OB PROVIDER LABOR PROGRESS NOTE - NS_OBIHIFHRDETAILS_OBGYN_ALL_OB_FT
indeterminant baseline
130, mod w/ periods of minimal variability, intermittent late decel (improvement with re-positioning)
Cat 1

## 2023-05-21 LAB
ALBUMIN SERPL ELPH-MCNC: 3.3 G/DL — SIGNIFICANT CHANGE UP (ref 3.3–5)
ALP SERPL-CCNC: 163 U/L — HIGH (ref 40–120)
ALT FLD-CCNC: 44 U/L — SIGNIFICANT CHANGE UP (ref 10–45)
ANION GAP SERPL CALC-SCNC: 14 MMOL/L — SIGNIFICANT CHANGE UP (ref 5–17)
APTT BLD: 27.7 SEC — SIGNIFICANT CHANGE UP (ref 27.5–35.5)
AST SERPL-CCNC: 44 U/L — HIGH (ref 10–40)
BASOPHILS # BLD AUTO: 0 K/UL — SIGNIFICANT CHANGE UP (ref 0–0.2)
BASOPHILS NFR BLD AUTO: 0 % — SIGNIFICANT CHANGE UP (ref 0–2)
BILIRUB SERPL-MCNC: 0.5 MG/DL — SIGNIFICANT CHANGE UP (ref 0.2–1.2)
BUN SERPL-MCNC: 11 MG/DL — SIGNIFICANT CHANGE UP (ref 7–23)
CALCIUM SERPL-MCNC: 6.6 MG/DL — LOW (ref 8.4–10.5)
CHLORIDE SERPL-SCNC: 100 MMOL/L — SIGNIFICANT CHANGE UP (ref 96–108)
CO2 SERPL-SCNC: 21 MMOL/L — LOW (ref 22–31)
CREAT SERPL-MCNC: 1.03 MG/DL — SIGNIFICANT CHANGE UP (ref 0.5–1.3)
EGFR: 74 ML/MIN/1.73M2 — SIGNIFICANT CHANGE UP
EOSINOPHIL # BLD AUTO: 0 K/UL — SIGNIFICANT CHANGE UP (ref 0–0.5)
EOSINOPHIL NFR BLD AUTO: 0 % — SIGNIFICANT CHANGE UP (ref 0–6)
FIBRINOGEN PPP-MCNC: 524 MG/DL — HIGH (ref 200–445)
GLUCOSE SERPL-MCNC: 111 MG/DL — HIGH (ref 70–99)
HCT VFR BLD CALC: 35.5 % — SIGNIFICANT CHANGE UP (ref 34.5–45)
HCT VFR BLD CALC: 37.6 % — SIGNIFICANT CHANGE UP (ref 34.5–45)
HGB BLD-MCNC: 12 G/DL — SIGNIFICANT CHANGE UP (ref 11.5–15.5)
HGB BLD-MCNC: 12.5 G/DL — SIGNIFICANT CHANGE UP (ref 11.5–15.5)
INR BLD: 0.97 RATIO — SIGNIFICANT CHANGE UP (ref 0.88–1.16)
LDH SERPL L TO P-CCNC: 316 U/L — HIGH (ref 50–242)
LYMPHOCYTES # BLD AUTO: 20 % — SIGNIFICANT CHANGE UP (ref 13–44)
LYMPHOCYTES # BLD AUTO: 4.83 K/UL — HIGH (ref 1–3.3)
MAGNESIUM SERPL-MCNC: 6.6 MG/DL — HIGH (ref 1.6–2.6)
MAGNESIUM SERPL-MCNC: 7 MG/DL — HIGH (ref 1.6–2.6)
MAGNESIUM SERPL-MCNC: 7.6 MG/DL — HIGH (ref 1.6–2.6)
MCHC RBC-ENTMCNC: 30.1 PG — SIGNIFICANT CHANGE UP (ref 27–34)
MCHC RBC-ENTMCNC: 30.3 PG — SIGNIFICANT CHANGE UP (ref 27–34)
MCHC RBC-ENTMCNC: 33.2 GM/DL — SIGNIFICANT CHANGE UP (ref 32–36)
MCHC RBC-ENTMCNC: 33.8 GM/DL — SIGNIFICANT CHANGE UP (ref 32–36)
MCV RBC AUTO: 89.6 FL — SIGNIFICANT CHANGE UP (ref 80–100)
MCV RBC AUTO: 90.6 FL — SIGNIFICANT CHANGE UP (ref 80–100)
MONOCYTES # BLD AUTO: 0.63 K/UL — SIGNIFICANT CHANGE UP (ref 0–0.9)
MONOCYTES NFR BLD AUTO: 2.6 % — SIGNIFICANT CHANGE UP (ref 2–14)
NEUTROPHILS # BLD AUTO: 18.7 K/UL — HIGH (ref 1.8–7.4)
NEUTROPHILS NFR BLD AUTO: 77.4 % — HIGH (ref 43–77)
NRBC # BLD: 0 /100 WBCS — SIGNIFICANT CHANGE UP (ref 0–0)
PLATELET # BLD AUTO: 261 K/UL — SIGNIFICANT CHANGE UP (ref 150–400)
PLATELET # BLD AUTO: 267 K/UL — SIGNIFICANT CHANGE UP (ref 150–400)
POTASSIUM SERPL-MCNC: 4.5 MMOL/L — SIGNIFICANT CHANGE UP (ref 3.5–5.3)
POTASSIUM SERPL-SCNC: 4.5 MMOL/L — SIGNIFICANT CHANGE UP (ref 3.5–5.3)
PROT SERPL-MCNC: 6.2 G/DL — SIGNIFICANT CHANGE UP (ref 6–8.3)
PROTHROM AB SERPL-ACNC: 11.2 SEC — SIGNIFICANT CHANGE UP (ref 10.5–13.4)
RBC # BLD: 3.96 M/UL — SIGNIFICANT CHANGE UP (ref 3.8–5.2)
RBC # BLD: 4.15 M/UL — SIGNIFICANT CHANGE UP (ref 3.8–5.2)
RBC # FLD: 14 % — SIGNIFICANT CHANGE UP (ref 10.3–14.5)
RBC # FLD: 14 % — SIGNIFICANT CHANGE UP (ref 10.3–14.5)
SODIUM SERPL-SCNC: 135 MMOL/L — SIGNIFICANT CHANGE UP (ref 135–145)
URATE SERPL-MCNC: 9.6 MG/DL — HIGH (ref 2.5–7)
WBC # BLD: 24.16 K/UL — HIGH (ref 3.8–10.5)
WBC # BLD: 26.26 K/UL — HIGH (ref 3.8–10.5)
WBC # FLD AUTO: 24.16 K/UL — HIGH (ref 3.8–10.5)
WBC # FLD AUTO: 26.26 K/UL — HIGH (ref 3.8–10.5)

## 2023-05-21 RX ORDER — IBUPROFEN 200 MG
600 TABLET ORAL EVERY 6 HOURS
Refills: 0 | Status: DISCONTINUED | OUTPATIENT
Start: 2023-05-21 | End: 2023-05-24

## 2023-05-21 RX ORDER — MAGNESIUM SULFATE 500 MG/ML
2 VIAL (ML) INJECTION
Qty: 40 | Refills: 0 | Status: DISCONTINUED | OUTPATIENT
Start: 2023-05-21 | End: 2023-05-21

## 2023-05-21 RX ORDER — HEPARIN SODIUM 5000 [USP'U]/ML
5000 INJECTION INTRAVENOUS; SUBCUTANEOUS EVERY 12 HOURS
Refills: 0 | Status: DISCONTINUED | OUTPATIENT
Start: 2023-05-21 | End: 2023-05-24

## 2023-05-21 RX ADMIN — Medication 975 MILLIGRAM(S): at 12:26

## 2023-05-21 RX ADMIN — Medication 30 MILLIGRAM(S): at 08:56

## 2023-05-21 RX ADMIN — Medication 975 MILLIGRAM(S): at 05:18

## 2023-05-21 RX ADMIN — Medication 975 MILLIGRAM(S): at 05:59

## 2023-05-21 RX ADMIN — Medication 30 MILLIGRAM(S): at 15:05

## 2023-05-21 RX ADMIN — Medication 975 MILLIGRAM(S): at 17:26

## 2023-05-21 RX ADMIN — Medication 30 MILLIGRAM(S): at 20:33

## 2023-05-21 RX ADMIN — Medication 30 MILLIGRAM(S): at 08:26

## 2023-05-21 RX ADMIN — Medication 975 MILLIGRAM(S): at 11:56

## 2023-05-21 RX ADMIN — Medication 600 MILLIGRAM(S): at 20:34

## 2023-05-21 RX ADMIN — HEPARIN SODIUM 5000 UNIT(S): 5000 INJECTION INTRAVENOUS; SUBCUTANEOUS at 05:15

## 2023-05-21 RX ADMIN — HEPARIN SODIUM 5000 UNIT(S): 5000 INJECTION INTRAVENOUS; SUBCUTANEOUS at 20:34

## 2023-05-21 RX ADMIN — Medication 25 MICROGRAM(S): at 05:14

## 2023-05-21 RX ADMIN — Medication 30 MILLIGRAM(S): at 15:29

## 2023-05-21 NOTE — OB PROVIDER DELIVERY SUMMARY - NSPROVIDERDELIVERYNOTE_OBGYN_ALL_OB_FT
viable male infant, cephalic presentation, weight 2010g, APGARS 5/8  grossly normal uterus, b/l tubes and ovaries  hysterotomy closed in 1 layer  peritoneum closed with vicryl suture  rectus muscle reapproximated with vicryl suture    252/1200/400    Dictation #: [id number]

## 2023-05-21 NOTE — OB PROVIDER DELIVERY SUMMARY - NSSELHIDDEN_OBGYN_ALL_OB_FT
[NS_DeliveryAttending1_OBGYN_ALL_OB_FT:[id number]=],[NS_DeliveryAssist1_OBGYN_ALL_OB_FT:[id number]=],[NS_DeliveryRN_OBGYN_ALL_OB_FT:[id number]=]

## 2023-05-21 NOTE — CHART NOTE - NSCHARTNOTEFT_GEN_A_CORE
Patient seen for: nutrition consult for GDM postpartum education prior to discharge    Source: patient, EMR    Patient is a 33yo female   Admission date: 5/19/23  Antepartum course significant for GDMA1.  Pt plans on breastfeeding infant    Chart reviewed, events noted. Meds/Labs reviewed.    Anthropometrics:  Height: 60 inches  Pre-pregnancy weight: 180 pounds   Weight gain: 14 pounds   Admit/Dosing wt: 194 pounds     Nutrition Diagnosis:   Food and Nutrition Related Knowledge Deficit related to knowledge of post-partum GDM nutrition recommendations as evidenced by lack of previous exposure to education.       Goal: Pt able to teach back 2 points of nutrition education provided.     Nutrition Intervention:  Post-partum GDM diet education provided to patient and Spouse at bedside:   1) Increased risk of developing T2DM with GDM and ways to help prevent development  2) 4-12 week fasting oral glucose tolerance test follow-up as outpatient  3) General healthful diet and physical activity recommendations discussed  4) Increased energy needs with breastfeeding    After-delivery GDM education handout provided.      Monitoring:  No other nutrition risks were identified during this encounter.  RD remains available upon request and will follow up per protocol.    Alexandra Farmer, MS, RDN, CDN (Teams/Pager #[phone])
Patient's tracing has noted to be category 2 for multiple hours. On recent exam patient noted to be 1-2cm (remote from delivery). Given the fact that patient's tracing is persistently category 2 remote from delivery, decision made to proceed with . Patient amenable to plan    -Dr Fontaine in route  -charge made aware  -anesthesia to be made aware    discussed w Dr Minal Rosario PGY2
R3 Tracing note    FHT Baseline 125, moderate variability, +accel, -decel   [place] q4 min ctx     A/P  31yo  at 34w4d IOL sPEC on Mg and Procardia 30XL     - continue to monitor BPs and HELLP labs  - fetal status reassuring at this time  - continuous fetal monitoring   - continue IOL per plan     Gwendolyn Shin PGY3

## 2023-05-22 ENCOUNTER — TRANSCRIPTION ENCOUNTER (OUTPATIENT)
Age: 33
End: 2023-05-22

## 2023-05-22 LAB
ALBUMIN SERPL ELPH-MCNC: 2.9 G/DL — LOW (ref 3.3–5)
ALP SERPL-CCNC: 145 U/L — HIGH (ref 40–120)
ALT FLD-CCNC: 32 U/L — SIGNIFICANT CHANGE UP (ref 10–45)
ANION GAP SERPL CALC-SCNC: 11 MMOL/L — SIGNIFICANT CHANGE UP (ref 5–17)
APTT BLD: 28.3 SEC — SIGNIFICANT CHANGE UP (ref 27.5–35.5)
AST SERPL-CCNC: 36 U/L — SIGNIFICANT CHANGE UP (ref 10–40)
BASOPHILS # BLD AUTO: 0.06 K/UL — SIGNIFICANT CHANGE UP (ref 0–0.2)
BASOPHILS NFR BLD AUTO: 0.4 % — SIGNIFICANT CHANGE UP (ref 0–2)
BILIRUB SERPL-MCNC: 0.2 MG/DL — SIGNIFICANT CHANGE UP (ref 0.2–1.2)
BUN SERPL-MCNC: 13 MG/DL — SIGNIFICANT CHANGE UP (ref 7–23)
CALCIUM SERPL-MCNC: 6.8 MG/DL — LOW (ref 8.4–10.5)
CHLORIDE SERPL-SCNC: 105 MMOL/L — SIGNIFICANT CHANGE UP (ref 96–108)
CO2 SERPL-SCNC: 21 MMOL/L — LOW (ref 22–31)
CREAT SERPL-MCNC: 0.96 MG/DL — SIGNIFICANT CHANGE UP (ref 0.5–1.3)
EGFR: 80 ML/MIN/1.73M2 — SIGNIFICANT CHANGE UP
EOSINOPHIL # BLD AUTO: 0.03 K/UL — SIGNIFICANT CHANGE UP (ref 0–0.5)
EOSINOPHIL NFR BLD AUTO: 0.2 % — SIGNIFICANT CHANGE UP (ref 0–6)
GLUCOSE SERPL-MCNC: 111 MG/DL — HIGH (ref 70–99)
HCT VFR BLD CALC: 36 % — SIGNIFICANT CHANGE UP (ref 34.5–45)
HGB BLD-MCNC: 12 G/DL — SIGNIFICANT CHANGE UP (ref 11.5–15.5)
IMM GRANULOCYTES NFR BLD AUTO: 0.5 % — SIGNIFICANT CHANGE UP (ref 0–0.9)
INR BLD: 0.92 RATIO — SIGNIFICANT CHANGE UP (ref 0.88–1.16)
LDH SERPL L TO P-CCNC: 275 U/L — HIGH (ref 50–242)
LYMPHOCYTES # BLD AUTO: 18 % — SIGNIFICANT CHANGE UP (ref 13–44)
LYMPHOCYTES # BLD AUTO: 2.71 K/UL — SIGNIFICANT CHANGE UP (ref 1–3.3)
MCHC RBC-ENTMCNC: 30.1 PG — SIGNIFICANT CHANGE UP (ref 27–34)
MCHC RBC-ENTMCNC: 33.3 GM/DL — SIGNIFICANT CHANGE UP (ref 32–36)
MCV RBC AUTO: 90.2 FL — SIGNIFICANT CHANGE UP (ref 80–100)
MONOCYTES # BLD AUTO: 0.79 K/UL — SIGNIFICANT CHANGE UP (ref 0–0.9)
MONOCYTES NFR BLD AUTO: 5.2 % — SIGNIFICANT CHANGE UP (ref 2–14)
NEUTROPHILS # BLD AUTO: 11.41 K/UL — HIGH (ref 1.8–7.4)
NEUTROPHILS NFR BLD AUTO: 75.7 % — SIGNIFICANT CHANGE UP (ref 43–77)
NRBC # BLD: 0 /100 WBCS — SIGNIFICANT CHANGE UP (ref 0–0)
PLATELET # BLD AUTO: 281 K/UL — SIGNIFICANT CHANGE UP (ref 150–400)
POTASSIUM SERPL-MCNC: 4.3 MMOL/L — SIGNIFICANT CHANGE UP (ref 3.5–5.3)
POTASSIUM SERPL-SCNC: 4.3 MMOL/L — SIGNIFICANT CHANGE UP (ref 3.5–5.3)
PROT SERPL-MCNC: 5.9 G/DL — LOW (ref 6–8.3)
PROTHROM AB SERPL-ACNC: 10.7 SEC — SIGNIFICANT CHANGE UP (ref 10.5–13.4)
RBC # BLD: 3.99 M/UL — SIGNIFICANT CHANGE UP (ref 3.8–5.2)
RBC # FLD: 14.2 % — SIGNIFICANT CHANGE UP (ref 10.3–14.5)
SODIUM SERPL-SCNC: 137 MMOL/L — SIGNIFICANT CHANGE UP (ref 135–145)
URATE SERPL-MCNC: 9.2 MG/DL — HIGH (ref 2.5–7)
WBC # BLD: 15.08 K/UL — HIGH (ref 3.8–10.5)
WBC # FLD AUTO: 15.08 K/UL — HIGH (ref 3.8–10.5)

## 2023-05-22 RX ORDER — NIFEDIPINE 30 MG
10 TABLET, EXTENDED RELEASE 24 HR ORAL ONCE
Refills: 0 | Status: COMPLETED | OUTPATIENT
Start: 2023-05-22 | End: 2023-05-22

## 2023-05-22 RX ORDER — NIFEDIPINE 30 MG
60 TABLET, EXTENDED RELEASE 24 HR ORAL ONCE
Refills: 0 | Status: COMPLETED | OUTPATIENT
Start: 2023-05-22 | End: 2023-05-22

## 2023-05-22 RX ORDER — NIFEDIPINE 30 MG
60 TABLET, EXTENDED RELEASE 24 HR ORAL DAILY
Refills: 0 | Status: DISCONTINUED | OUTPATIENT
Start: 2023-05-23 | End: 2023-05-24

## 2023-05-22 RX ADMIN — Medication 25 MICROGRAM(S): at 06:00

## 2023-05-22 RX ADMIN — Medication 975 MILLIGRAM(S): at 12:35

## 2023-05-22 RX ADMIN — Medication 60 MILLIGRAM(S): at 19:00

## 2023-05-22 RX ADMIN — Medication 600 MILLIGRAM(S): at 16:15

## 2023-05-22 RX ADMIN — Medication 600 MILLIGRAM(S): at 22:45

## 2023-05-22 RX ADMIN — Medication 975 MILLIGRAM(S): at 17:22

## 2023-05-22 RX ADMIN — HEPARIN SODIUM 5000 UNIT(S): 5000 INJECTION INTRAVENOUS; SUBCUTANEOUS at 08:49

## 2023-05-22 RX ADMIN — Medication 600 MILLIGRAM(S): at 02:43

## 2023-05-22 RX ADMIN — Medication 975 MILLIGRAM(S): at 13:30

## 2023-05-22 RX ADMIN — Medication 600 MILLIGRAM(S): at 08:48

## 2023-05-22 RX ADMIN — HEPARIN SODIUM 5000 UNIT(S): 5000 INJECTION INTRAVENOUS; SUBCUTANEOUS at 21:49

## 2023-05-22 RX ADMIN — Medication 600 MILLIGRAM(S): at 15:30

## 2023-05-22 RX ADMIN — Medication 10 MILLIGRAM(S): at 19:20

## 2023-05-22 RX ADMIN — Medication 975 MILLIGRAM(S): at 00:01

## 2023-05-22 RX ADMIN — Medication 600 MILLIGRAM(S): at 21:50

## 2023-05-22 RX ADMIN — Medication 600 MILLIGRAM(S): at 09:30

## 2023-05-22 NOTE — DISCHARGE NOTE OB - COMMENTS
Check blood pressure 2 times daily.  Notify md for blood pressure greater than 150/90 and/or signs of hypertension as reviewed Check blood pressure 2 times daily.  Notify md for blood pressure greater than 160/100 (per MD) and/or signs of hypertension as reviewed

## 2023-05-22 NOTE — PROVIDER CONTACT NOTE (OTHER) - BACKGROUND
Primary C. Section post op day 2 s/p Mg sPEC. Procardia 60 xl daily increased from 30
C/S @ 1940, on magnesium for PEC. Received patient on magnesium, lines checked with PACU nurse with magnesium running at 2 grams.
Primary C. Section post op day 2 s/p Mg sPEC. Procardia 30 xl daily.
Primary C. Section post op day 2 s/p Mg sPEC. Procardia 30 xl daily.
c/s s/p MG day 2. Pt procardia increased from 30 to 60.

## 2023-05-22 NOTE — DISCHARGE NOTE OB - CARE PROVIDERS DIRECT ADDRESSES
,DirectAddress_Unknown ,DirectAddress_Unknown,[email or web address],DirectAddress_Unknown

## 2023-05-22 NOTE — DISCHARGE NOTE OB - PATIENT PORTAL LINK FT
You can access the FollowMyHealth Patient Portal offered by [facility] by registering at the following website: [email or web address]. By joining [employer]’s FollowMyHealth portal, you will also be able to view your health information using other applications (apps) compatible with our system.

## 2023-05-22 NOTE — DISCHARGE NOTE OB - HOSPITAL COURSE
Uncomplicated  delivery for NRFHT. sPEC and HELLP. Routine post partum course. BPs controlled on procardia

## 2023-05-22 NOTE — DISCHARGE NOTE OB - CARE PROVIDER_API CALL
Dimitris Mccarty)  Obstetrics and Gynecology  [address] [place] [address]  Phone: [phone]  Fax: [phone]  Follow Up Time: 2 weeks   Dimitris Mccarty  Obstetrics and Gynecology  [address] [place] [address]  Phone: [phone]  Fax: [phone]  Follow Up Time: 2 weeks    Blanca Rivera)  Cardiovascular Disease  [address] [place] [address]  Phone: [phone]  Fax: [phone]  Follow Up Time:     Goldberg, Joel (MD; FACP)  Cardiovascular Disease; Internal Medicine  [address] [place] [address]  Phone: [phone]  Fax: [phone]  Follow Up Time:

## 2023-05-22 NOTE — DISCHARGE NOTE OB - PROVIDER TOKENS
PROVIDER:[TOKEN:[[id number]],FOLLOWUP:[2 weeks]] PROVIDER:[TOKEN:[[id number]],FOLLOWUP:[2 weeks]],PROVIDER:[TOKEN:[[id number]]],PROVIDER:[TOKEN:[[id number]]]

## 2023-05-22 NOTE — DISCHARGE NOTE OB - CARE PLAN
Principal Discharge DX:	 delivery delivered  Assessment and plan of treatment:	Follow up in 2 weeks   1 Principal Discharge DX:	 delivery delivered  Assessment and plan of treatment:	Follow up in 2 weeks  Assessment and plan of treatment:	Please follow up with Dr Blanca Rivera or Dr Joel Goldberg for Blood pressure

## 2023-05-22 NOTE — DISCHARGE NOTE OB - MEDICATION SUMMARY - MEDICATIONS TO TAKE
I will START or STAY ON the medications listed below when I get home from the hospital:    ibuprofen 600 mg oral tablet  -- 1 tab(s) by mouth every 6 hours  -- Indication: For pain    acetaminophen 325 mg oral tablet  -- 3 tab(s) by mouth every 6 hours as needed for  mild pain  -- Indication: For pain    NIFEdipine 60 mg oral tablet, extended release  -- 1 tab(s) by mouth once a day  -- Indication: For Hypertension   I will START or STAY ON the medications listed below when I get home from the hospital:    ibuprofen 600 mg oral tablet  -- 1 tab(s) by mouth every 6 hours  -- Indication: For pain    acetaminophen 325 mg oral tablet  -- 3 tab(s) by mouth every 6 hours as needed for  mild pain  -- Indication: For pain    NIFEdipine 30 mg oral tablet, extended release  -- 1 tab(s) by mouth once a day Take in AM MDD: 1  -- Indication: For Elevated Blood pressure    NIFEdipine 60 mg oral tablet, extended release  -- 1 tab(s) by mouth once a day Take in PM  -- Indication: For Elevated Blood pressure    levothyroxine 25 mcg (0.025 mg) oral tablet  -- 1 tab(s) by mouth once a day  -- Indication: For Home med

## 2023-05-22 NOTE — PROVIDER CONTACT NOTE (OTHER) - SITUATION
Pt complains of visual changes
blood pressure 166/104
Severe range BP before administering procardia 60
bp 168/109 hr 87
repeat blood pressure 157/97
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
## 2023-05-22 NOTE — DISCHARGE NOTE OB - NS MD DC FALL RISK RISK
For information on Fall & Injury Prevention, visit: [email or web address] OR  [email or web address] OR  https://www.cdc.gov/steadi/patient.html

## 2023-05-23 PROBLEM — E03.9 HYPOTHYROIDISM, UNSPECIFIED: Chronic | Status: ACTIVE | Noted: 2023-05-19

## 2023-05-23 RX ORDER — NIFEDIPINE 30 MG
30 TABLET, EXTENDED RELEASE 24 HR ORAL DAILY
Refills: 0 | Status: DISCONTINUED | OUTPATIENT
Start: 2023-05-23 | End: 2023-05-24

## 2023-05-23 RX ORDER — ACETAMINOPHEN 500 MG
3 TABLET ORAL
Qty: 0 | Refills: 0 | DISCHARGE
Start: 2023-05-23

## 2023-05-23 RX ORDER — NIFEDIPINE 30 MG
1 TABLET, EXTENDED RELEASE 24 HR ORAL
Qty: 0 | Refills: 0 | DISCHARGE
Start: 2023-05-23

## 2023-05-23 RX ORDER — NIFEDIPINE 30 MG
30 TABLET, EXTENDED RELEASE 24 HR ORAL DAILY
Refills: 0 | Status: DISCONTINUED | OUTPATIENT
Start: 2023-05-23 | End: 2023-05-23

## 2023-05-23 RX ORDER — IBUPROFEN 200 MG
1 TABLET ORAL
Qty: 0 | Refills: 0 | DISCHARGE
Start: 2023-05-23

## 2023-05-23 RX ADMIN — Medication 30 MILLIGRAM(S): at 11:20

## 2023-05-23 RX ADMIN — HEPARIN SODIUM 5000 UNIT(S): 5000 INJECTION INTRAVENOUS; SUBCUTANEOUS at 09:49

## 2023-05-23 RX ADMIN — Medication 25 MICROGRAM(S): at 06:07

## 2023-05-23 RX ADMIN — Medication 975 MILLIGRAM(S): at 06:06

## 2023-05-23 RX ADMIN — Medication 600 MILLIGRAM(S): at 09:50

## 2023-05-23 RX ADMIN — Medication 975 MILLIGRAM(S): at 18:53

## 2023-05-23 RX ADMIN — Medication 600 MILLIGRAM(S): at 21:30

## 2023-05-23 RX ADMIN — Medication 975 MILLIGRAM(S): at 19:33

## 2023-05-23 RX ADMIN — Medication 975 MILLIGRAM(S): at 00:00

## 2023-05-23 RX ADMIN — Medication 600 MILLIGRAM(S): at 21:27

## 2023-05-23 RX ADMIN — Medication 600 MILLIGRAM(S): at 10:30

## 2023-05-23 RX ADMIN — Medication 975 MILLIGRAM(S): at 00:02

## 2023-05-23 RX ADMIN — HEPARIN SODIUM 5000 UNIT(S): 5000 INJECTION INTRAVENOUS; SUBCUTANEOUS at 21:27

## 2023-05-23 RX ADMIN — Medication 60 MILLIGRAM(S): at 21:27

## 2023-05-23 NOTE — PROVIDER CONTACT NOTE (OTHER) - REASON
elevated repeat blood pressure
Severe range Blood pressure
Severe range BP
/96, pt denies any s/s of elevated BPs
Severe range blood pressure
Pt complains of seeing double

## 2023-05-23 NOTE — PROVIDER CONTACT NOTE (OTHER) - ASSESSMENT
Patient asymptomatic denies headache, visual changes, RUQ pain
pt remains asymptomatic. denies blurry vision, headaches, epigastric pain.  BP: 161/96
Pt denies headache, visual changes or epigastric pain
Patient asymptomatic denies headache, visual changes, RUQ pain
Patient asymptomatic denies headache, visual changes, RUQ pain
Performed vital signs, assessment, and admission folder with patient. Pt complains she is seeing double. Pt denies right epigastric pain, headaches. Pt denies feeling lightheaded or dizzy. Pt lungs clear, patellar reflexes +2.

## 2023-05-23 NOTE — PROVIDER CONTACT NOTE (OTHER) - DATE AND TIME:
22-May-2023 19:06
21-May-2023 01:40
22-May-2023 17:17
22-May-2023 19:15
23-May-2023 10:11
22-May-2023 17:21

## 2023-05-23 NOTE — PROVIDER CONTACT NOTE (OTHER) - RECOMMENDATIONS
repeat in 15 min
contact provider; r/p BP
treat hypertension with medication
Reviewed orders in [facility] and clarified with Dr. Powers that the patient should currently be on magnesium 1 gram.
repeat blood pressure in 15 minutes

## 2023-05-23 NOTE — PROGRESS NOTE ADULT - ATTENDING COMMENTS
Pt is s/p  section. She is doing well without complaints. Denies HA, lightheadedness, dizziness, CP, SOB, palpitations, abdominal pain, heavy vaginal bleeding.     T(C): 36.7 (23 @ 08:22), Max: 36.9 (23 @ 04:00)  HR: 72 (23 @ 08:22) (62 - 112)  BP: 148/91 (23 @ 08:22) (101/67 - 181/111)  RR: 18 (23 @ 08:22) (13 - 23)  SpO2: 98% (23 @ 08:22) (95% - 100%)    Physical exam:   Abd: NTND, fundus firm and well contracted, incision CDI  VE: Appropriate vaginal bleeding    Plan  -LFT down-trending, HELLP labs stable  -Elevated WBC - will repeat with last Mg level   -BPs labile - continue to monitor for increase of procardia  -Cont Mg 24hr PP  -Continue procardia 30  -Monitor VS  -Agree with above plan/examination    ny
Pt is s/p  section. She is doing well without complaints. Denies HA, lightheadedness, dizziness, CP, SOB, palpitations, abdominal pain, heavy vaginal bleeding.     T(C): 36.8 (23 @ 05:52), Max: 36.8 (23 @ 18:13)  HR: 74 (23 @ 05:52) (71 - 83)  BP: 122/81 (23 @ 05:52) (118/73 - 158/100)  RR: 18 (23 @ 05:52) (18 - 18)  SpO2: 98% (23 @ 05:52) (98% - 100%)    Physical exam:   Abd: NTND, fundus firm and well contracted, incision CDI  VE: Appropriate vaginal bleeding    Plan  -Cont Procardia 30QD - Labile BPs, will monitor throughout the day to determine if increase in BP meds is necessary  -HELLP labs wnl in AM   -s/p Mg  -Monitor VS  -Agree with above plan/examination    ny
Agree with assessment and plan. Pt doing well without complaints.   Vitals stable. Exam Benign  - BPs better controlled on Procardia 60  - LFTs and labs improving  - Routine post partum care. If BPs remains stable, consider d/c home

## 2023-05-24 VITALS
HEART RATE: 100 BPM | TEMPERATURE: 98 F | SYSTOLIC BLOOD PRESSURE: 143 MMHG | DIASTOLIC BLOOD PRESSURE: 92 MMHG | RESPIRATION RATE: 18 BRPM | OXYGEN SATURATION: 98 %

## 2023-05-24 PROCEDURE — 82570 ASSAY OF URINE CREATININE: CPT

## 2023-05-24 PROCEDURE — 83735 ASSAY OF MAGNESIUM: CPT

## 2023-05-24 PROCEDURE — 86901 BLOOD TYPING SEROLOGIC RH(D): CPT

## 2023-05-24 PROCEDURE — 86780 TREPONEMA PALLIDUM: CPT

## 2023-05-24 PROCEDURE — 83615 LACTATE (LD) (LDH) ENZYME: CPT

## 2023-05-24 PROCEDURE — 36415 COLL VENOUS BLD VENIPUNCTURE: CPT

## 2023-05-24 PROCEDURE — 81001 URINALYSIS AUTO W/SCOPE: CPT

## 2023-05-24 PROCEDURE — 86769 SARS-COV-2 COVID-19 ANTIBODY: CPT

## 2023-05-24 PROCEDURE — 85730 THROMBOPLASTIN TIME PARTIAL: CPT

## 2023-05-24 PROCEDURE — 80053 COMPREHEN METABOLIC PANEL: CPT

## 2023-05-24 PROCEDURE — 85025 COMPLETE CBC W/AUTO DIFF WBC: CPT

## 2023-05-24 PROCEDURE — 85384 FIBRINOGEN ACTIVITY: CPT

## 2023-05-24 PROCEDURE — 59025 FETAL NON-STRESS TEST: CPT

## 2023-05-24 PROCEDURE — 84550 ASSAY OF BLOOD/URIC ACID: CPT

## 2023-05-24 PROCEDURE — 84156 ASSAY OF PROTEIN URINE: CPT

## 2023-05-24 PROCEDURE — 86900 BLOOD TYPING SEROLOGIC ABO: CPT

## 2023-05-24 PROCEDURE — 59050 FETAL MONITOR W/REPORT: CPT

## 2023-05-24 PROCEDURE — 88307 TISSUE EXAM BY PATHOLOGIST: CPT

## 2023-05-24 PROCEDURE — 85610 PROTHROMBIN TIME: CPT

## 2023-05-24 PROCEDURE — 86850 RBC ANTIBODY SCREEN: CPT

## 2023-05-24 PROCEDURE — 82962 GLUCOSE BLOOD TEST: CPT

## 2023-05-24 RX ORDER — NIFEDIPINE 30 MG
1 TABLET, EXTENDED RELEASE 24 HR ORAL
Qty: 30 | Refills: 0
Start: 2023-05-24

## 2023-05-24 RX ORDER — LEVOTHYROXINE SODIUM 125 MCG
1 TABLET ORAL
Qty: 0 | Refills: 0 | DISCHARGE
Start: 2023-05-24

## 2023-05-24 RX ORDER — NIFEDIPINE 30 MG
1 TABLET, EXTENDED RELEASE 24 HR ORAL
Qty: 30 | Refills: 1
Start: 2023-05-24

## 2023-05-24 RX ADMIN — Medication 600 MILLIGRAM(S): at 09:23

## 2023-05-24 RX ADMIN — HEPARIN SODIUM 5000 UNIT(S): 5000 INJECTION INTRAVENOUS; SUBCUTANEOUS at 09:22

## 2023-05-24 RX ADMIN — Medication 30 MILLIGRAM(S): at 09:24

## 2023-05-24 RX ADMIN — Medication 25 MICROGRAM(S): at 05:45

## 2023-05-24 RX ADMIN — Medication 600 MILLIGRAM(S): at 10:20

## 2023-05-24 NOTE — PROGRESS NOTE ADULT - ASSESSMENT
31y/o POD#4 from pLTCS (ebl 152) for cat 2 c/b sPEC in stable condition.     #sPEC  - s/p Lab 20 IVP  -Increased to Procardia 30XL in AM and 60XL in the PM on 5/23  -s/p magnesium x24 hours postpartum for seizure ppx  -Cr: 0.96->>0.92->1.03->0.96   AST/ALT: 57/53->>60/55->44/44->36/32    #Postpartum  - HSQ and SCDs for DVT ppx   - Continue regular diet  - Encourage incentive spirometry and increase ambulation  - Pain control prn     Gwendolyn Shin PGY3    
33y/o POD#3 from pLTCS (ebl 152) for cat 2 c/b sPEC in stable condition.     #sPEC  - s/p Lab 20 IVP  -Increased to Procardia 60XL qd on 5/22  -s/p magnesium x24 hours postpartum for seizure ppx  -Cr: 0.96->>0.92->1.03->0.96   AST/ALT: 57/53->>60/55->44/44->36/32    #Postpartum  - HSQ and SCDs for DVT ppx   - Continue regular diet  - Encourage incentive spirometry and increase ambulation  - Pain control prn     Gwendolyn Shin PGY3    
31y/o POD#1 from pLTCS (ebl 152) for cat 2 c/b sPEC in stable condition.     #sPEC  - s/p Lab 20 IVP  -Continue magnesium x24 hours postpartum for seizure ppx  -Continue Procardia 30XL qd  -F/u AM HELLP labs   -Mag serum q6 hours  -UOP adequate, continue strict Is/Os while on mag infusion     #Postpartum  - HSQ and SCDs for DVT ppx   - Continue regular diet  - Encourage incentive spirometry and increase ambulation  - d/c Mederos with Mag  - d/c IVF with Mag    Lesia Powers, PGY-3
31y/o POD#2 from pLTCS (ebl 152) for cat 2 c/b sPEC in stable condition.     #sPEC  - s/p Lab 20 IVP  -Continue Procardia 30XL qd  -s/p magnesium x24 hours postpartum for seizure ppx  -Cr: 0.96->>0.92->1.03   AST/ALT: 57/53->>60/55->44/44  -F/u AM HELLP labs     #Postpartum  - HSQ and SCDs for DVT ppx   - Continue regular diet  - Encourage incentive spirometry and increase ambulation  - Pain control prn     Gwendolyn Shin PGY3

## 2023-05-24 NOTE — PROGRESS NOTE ADULT - SUBJECTIVE AND OBJECTIVE BOX
Patient seen and examined at bedside, no acute overnight events. No acute concerns, pain well controlled. Patient is ambulating and tolerating regular diet. Has not yet passed flatus. Voiding spontaneously. Denies CP, SOB, N/V, HA, blurred vision, epigastric pain.    Vital Signs Last 24 Hours  T(C): 36.2 (05-21-23 @ 01:57), Max: 36.8 (05-20-23 @ 14:45)  HR: 73 (05-21-23 @ 01:57) (62 - 112)  BP: 130/84 (05-21-23 @ 01:57) (111/57 - 181/111)  RR: 18 (05-21-23 @ 01:57) (13 - 23)  SpO2: 97% (05-21-23 @ 01:57) (93% - 100%)    I&O's Summary    19 May 2023 07:01  -  20 May 2023 07:00  --------------------------------------------------------  IN: 1255 mL / OUT: 1800 mL / NET: -545 mL    20 May 2023 07:01  -  21 May 2023 03:24  --------------------------------------------------------  IN: 1000 mL / OUT: 2562 mL / NET: -1562 mL        Physical Exam:  General: NAD  Abdomen: Soft, non-tender, non-distended, fundus firm  Incision: Pfannensteil incision CDI, subcuticular suture closure  Pelvic: Lochia wnl    Labs:    Blood Type: O Positive  Antibody Screen: Negative  RPR: Negative               13.7   21.48 )-----------( 286      ( 05-20 @ 21:36 )             41.1                15.3   14.35 )-----------( 294      ( 05-20 @ 15:41 )             45.1                15.3   13.36 )-----------( 284      ( 05-20 @ 09:22 )             44.7         MEDICATIONS  (STANDING):  acetaminophen     Tablet .. 975 milliGRAM(s) Oral <User Schedule>  diphtheria/tetanus/pertussis (acellular) Vaccine (Adacel) 0.5 milliLiter(s) IntraMuscular once  ibuprofen  Tablet. 600 milliGRAM(s) Oral every 6 hours  ketorolac   Injectable 30 milliGRAM(s) IV Push every 6 hours  lactated ringers. 1000 milliLiter(s) (75 mL/Hr) IV Continuous <Continuous>  levothyroxine 25 MICROGram(s) Oral daily  magnesium sulfate Infusion 1 Gm/Hr (25 mL/Hr) IV Continuous <Continuous>  morphine PF Spinal 0.1 milliGRAM(s) IntraThecal. once  NIFEdipine XL 30 milliGRAM(s) Oral daily  oxytocin Infusion 333.333 milliUNIT(s)/Min (1000 mL/Hr) IV Continuous <Continuous>    MEDICATIONS  (PRN):  diphenhydrAMINE 25 milliGRAM(s) Oral every 6 hours PRN Pruritus  lanolin Ointment 1 Application(s) Topical every 6 hours PRN Sore Nipples  magnesium hydroxide Suspension 30 milliLiter(s) Oral two times a day PRN Constipation  oxyCODONE    IR 5 milliGRAM(s) Oral every 3 hours PRN Moderate to Severe Pain (4-10)  oxyCODONE    IR 5 milliGRAM(s) Oral once PRN Moderate to Severe Pain (4-10)  simethicone 80 milliGRAM(s) Chew every 4 hours PRN Gas    
Day 1 of Anesthesia Pain Management Service    SUBJECTIVE:  Pain Scale Score:          [X] Refer to charted pain scores    THERAPY:    s/p 0.1mg PF morphine on 5/20/23      MEDICATIONS  (STANDING):  acetaminophen     Tablet .. 975 milliGRAM(s) Oral <User Schedule>  diphtheria/tetanus/pertussis (acellular) Vaccine (Adacel) 0.5 milliLiter(s) IntraMuscular once  heparin   Injectable 5000 Unit(s) SubCutaneous every 12 hours  ibuprofen  Tablet. 600 milliGRAM(s) Oral every 6 hours  ketorolac   Injectable 30 milliGRAM(s) IV Push every 6 hours  lactated ringers. 1000 milliLiter(s) (75 mL/Hr) IV Continuous <Continuous>  levothyroxine 25 MICROGram(s) Oral daily  magnesium sulfate Infusion 1 Gm/Hr (25 mL/Hr) IV Continuous <Continuous>  morphine PF Spinal 0.1 milliGRAM(s) IntraThecal. once  NIFEdipine XL 30 milliGRAM(s) Oral daily  oxytocin Infusion 333.333 milliUNIT(s)/Min (1000 mL/Hr) IV Continuous <Continuous>    MEDICATIONS  (PRN):  diphenhydrAMINE 25 milliGRAM(s) Oral every 6 hours PRN Pruritus  lanolin Ointment 1 Application(s) Topical every 6 hours PRN Sore Nipples  magnesium hydroxide Suspension 30 milliLiter(s) Oral two times a day PRN Constipation  oxyCODONE    IR 5 milliGRAM(s) Oral every 3 hours PRN Moderate to Severe Pain (4-10)  oxyCODONE    IR 5 milliGRAM(s) Oral once PRN Moderate to Severe Pain (4-10)  simethicone 80 milliGRAM(s) Chew every 4 hours PRN Gas      OBJECTIVE:    Sedation:        	[X] Alert	[ ] Drowsy	[ ] Arousable      [ ] Asleep       [ ] Unresponsive    Side Effects:	[X] None	[ ] Nausea	[ ] Vomiting         [ ] Pruritus  		[ ] Weakness            [ ] Numbness	          [ ] Other:    Vital Signs Last 24 Hrs  T(C): 36.7 (21 May 2023 08:22), Max: 36.9 (21 May 2023 04:00)  T(F): 98.1 (21 May 2023 08:22), Max: 98.4 (21 May 2023 04:00)  HR: 72 (21 May 2023 08:22) (62 - 112)  BP: 148/91 (21 May 2023 08:22) (101/67 - 181/111)  BP(mean): 97 (20 May 2023 23:35) (90 - 101)  RR: 18 (21 May 2023 10:26) (13 - 23)  SpO2: 100% (21 May 2023 10:26) (95% - 100%)    Parameters below as of 21 May 2023 10:26  Patient On (Oxygen Delivery Method): room air        ASSESSMENT/ PLAN  [X] Patient transitioned to prn analgesics  [X] Pain management per primary service, pain service to sign off   [X]Documentation and Verification of current medications
OB Progress Note: LTCS, POD#2    S: 33yo POD#2 s/p pLTCS. Pain is well controlled. She is tolerating a regular diet and passing flatus. She is voiding spontaneously, and ambulating without difficulty. Endorses light vaginal bleeding, soaking <1 pad/hr. Denies CP/SOB. Denies lightheadedness/dizziness. Denies N/V.    O:  Vitals:  Vital Signs Last 24 Hrs  T(C): 36.4 (22 May 2023 00:08), Max: 36.9 (21 May 2023 04:00)  T(F): 97.6 (22 May 2023 00:08), Max: 98.4 (21 May 2023 04:00)  HR: 76 (22 May 2023 00:08) (71 - 83)  BP: 134/85 (22 May 2023 00:08) (101/67 - 158/100)  BP(mean): --  RR: 18 (22 May 2023 00:08) (18 - 18)  SpO2: 98% (22 May 2023 00:08) (98% - 100%)    Parameters below as of 22 May 2023 00:08  Patient On (Oxygen Delivery Method): room air        MEDICATIONS  (STANDING):  acetaminophen     Tablet .. 975 milliGRAM(s) Oral <User Schedule>  diphtheria/tetanus/pertussis (acellular) Vaccine (Adacel) 0.5 milliLiter(s) IntraMuscular once  heparin   Injectable 5000 Unit(s) SubCutaneous every 12 hours  ibuprofen  Tablet. 600 milliGRAM(s) Oral every 6 hours  lactated ringers. 1000 milliLiter(s) (75 mL/Hr) IV Continuous <Continuous>  levothyroxine 25 MICROGram(s) Oral daily  NIFEdipine XL 30 milliGRAM(s) Oral daily  oxytocin Infusion 333.333 milliUNIT(s)/Min (1000 mL/Hr) IV Continuous <Continuous>      MEDICATIONS  (PRN):  diphenhydrAMINE 25 milliGRAM(s) Oral every 6 hours PRN Pruritus  lanolin Ointment 1 Application(s) Topical every 6 hours PRN Sore Nipples  magnesium hydroxide Suspension 30 milliLiter(s) Oral two times a day PRN Constipation  oxyCODONE    IR 5 milliGRAM(s) Oral every 3 hours PRN Moderate to Severe Pain (4-10)  oxyCODONE    IR 5 milliGRAM(s) Oral once PRN Moderate to Severe Pain (4-10)  simethicone 80 milliGRAM(s) Chew every 4 hours PRN Gas      Labs:  Blood type: O Positive  Rubella IgG: RPR: Negative                          12.0   24.16<H> >-----------< 261    ( 05-21 @ 14:30 )             35.5                        12.5   26.26<H> >-----------< 267    ( 05-21 @ 08:24 )             37.6                        13.7   21.48<H> >-----------< 286    ( 05-20 @ 21:36 )             41.1                        15.3   14.35<H> >-----------< 294    ( 05-20 @ 15:41 )             45.1<H>                        15.3   13.36<H> >-----------< 284    ( 05-20 @ 09:22 )             44.7                        14.1   16.09<H> >-----------< 272    ( 05-20 @ 02:41 )             41.4                        15.5   15.59<H> >-----------< 286    ( 05-19 @ 20:27 )             44.8    05-21-23 @ 08:25      135  |  100  |  11  ----------------------------<  111<H>  4.5   |  21<L>  |  1.03    05-20-23 @ 21:52      134<L>  |  101  |  10  ----------------------------<  114<H>  4.4   |  18<L>  |  0.92    05-20-23 @ 15:41      134<L>  |  98  |  10  ----------------------------<  94  4.3   |  21<L>  |  0.93    05-20-23 @ 02:41      136  |  102  |  10  ----------------------------<  88  4.1   |  18<L>  |  0.90    05-19-23 @ 20:27      137  |  102  |  12  ----------------------------<  81  4.5   |  18<L>  |  0.96        Ca    6.6<L>      21 May 2023 08:25  Ca    6.6<L>      20 May 2023 21:52  Ca    7.3<L>      20 May 2023 15:41  Ca    8.4      20 May 2023 02:41  Ca    9.7      19 May 2023 20:27  Mg     6.6<H>     05-21  Mg     7.0<H>     05-21  Mg     7.6<H>     05-21  Mg     6.0<H>     05-20  Mg     8.9<H>     05-20  Mg     7.7<H>     05-20  Mg     6.3<H>     05-20    TPro  6.2  /  Alb  3.3  /  TBili  0.5  /  DBili  x   /  AST  44<H>  /  ALT  44  /  AlkPhos  163<H>  05-21-23 @ 08:25  TPro  6.6  /  Alb  3.1<L>  /  TBili  0.5  /  DBili  x   /  AST  60<H>  /  ALT  55<H>  /  AlkPhos  177<H>  05-20-23 @ 21:52  TPro  7.6  /  Alb  3.8  /  TBili  0.5  /  DBili  x   /  AST  79<H>  /  ALT  67<H>  /  AlkPhos  202<H>  05-20-23 @ 15:41  TPro  6.8  /  Alb  3.5  /  TBili  0.4  /  DBili  x   /  AST  64<H>  /  ALT  56<H>  /  AlkPhos  186<H>  05-20-23 @ 02:41  TPro  7.4  /  Alb  3.8  /  TBili  0.4  /  DBili  x   /  AST  57<H>  /  ALT  53<H>  /  AlkPhos  198<H>  05-19-23 @ 20:27          PE:  General: NAD  Heart: extremities well-perfused  Lungs: breathing normally  Abdomen: Soft, appropriately tender, incision c/d/i, fundus firm  Extremities: No erythema, no pitting edema  
OB Progress Note: LTCS, POD#3    S: 31yo POD#3 s/p pLTCS. Pain is well controlled. She is tolerating a regular diet and passing flatus. She is voiding spontaneously, and ambulating without difficulty. Endorses light vaginal bleeding, soaking <1 pad/hr. Denies CP/SOB. Denies lightheadedness/dizziness. Denies N/V.    O:  Vitals:  Vital Signs Last 24 Hrs  T(C): 36.7 (23 May 2023 00:00), Max: 37.1 (22 May 2023 17:01)  T(F): 98.1 (23 May 2023 00:00), Max: 98.8 (22 May 2023 17:01)  HR: 75 (23 May 2023 00:00) (74 - 100)  BP: 135/74 (23 May 2023 00:00) (122/81 - 168/109)  BP(mean): 105 (22 May 2023 12:38) (105 - 105)  RR: 18 (23 May 2023 00:00) (18 - 18)  SpO2: 99% (23 May 2023 00:00) (97% - 99%)    Parameters below as of 23 May 2023 00:00  Patient On (Oxygen Delivery Method): room air        MEDICATIONS  (STANDING):  acetaminophen     Tablet .. 975 milliGRAM(s) Oral <User Schedule>  diphtheria/tetanus/pertussis (acellular) Vaccine (Adacel) 0.5 milliLiter(s) IntraMuscular once  heparin   Injectable 5000 Unit(s) SubCutaneous every 12 hours  ibuprofen  Tablet. 600 milliGRAM(s) Oral every 6 hours  lactated ringers. 1000 milliLiter(s) (75 mL/Hr) IV Continuous <Continuous>  levothyroxine 25 MICROGram(s) Oral daily  NIFEdipine XL 60 milliGRAM(s) Oral daily  oxytocin Infusion 333.333 milliUNIT(s)/Min (1000 mL/Hr) IV Continuous <Continuous>      MEDICATIONS  (PRN):  diphenhydrAMINE 25 milliGRAM(s) Oral every 6 hours PRN Pruritus  lanolin Ointment 1 Application(s) Topical every 6 hours PRN Sore Nipples  magnesium hydroxide Suspension 30 milliLiter(s) Oral two times a day PRN Constipation  oxyCODONE    IR 5 milliGRAM(s) Oral every 3 hours PRN Moderate to Severe Pain (4-10)  oxyCODONE    IR 5 milliGRAM(s) Oral once PRN Moderate to Severe Pain (4-10)  simethicone 80 milliGRAM(s) Chew every 4 hours PRN Gas      Labs:  Blood type: O Positive  Rubella IgG: RPR: Negative                          12.0   15.08<H> >-----------< 281    ( 05-22 @ 06:03 )             36.0                        12.0   24.16<H> >-----------< 261    ( 05-21 @ 14:30 )             35.5                        12.5   26.26<H> >-----------< 267    ( 05-21 @ 08:24 )             37.6                        13.7   21.48<H> >-----------< 286    ( 05-20 @ 21:36 )             41.1                        15.3   14.35<H> >-----------< 294    ( 05-20 @ 15:41 )             45.1<H>                        15.3   13.36<H> >-----------< 284    ( 05-20 @ 09:22 )             44.7    05-22-23 @ 06:03      137  |  105  |  13  ----------------------------<  111<H>  4.3   |  21<L>  |  0.96    05-21-23 @ 08:25      135  |  100  |  11  ----------------------------<  111<H>  4.5   |  21<L>  |  1.03    05-20-23 @ 21:52      134<L>  |  101  |  10  ----------------------------<  114<H>  4.4   |  18<L>  |  0.92    05-20-23 @ 15:41      134<L>  |  98  |  10  ----------------------------<  94  4.3   |  21<L>  |  0.93        Ca    6.8<L>      22 May 2023 06:03  Ca    6.6<L>      21 May 2023 08:25  Ca    6.6<L>      20 May 2023 21:52  Ca    7.3<L>      20 May 2023 15:41  Mg     6.6<H>     05-21  Mg     7.0<H>     05-21  Mg     7.6<H>     05-21  Mg     6.0<H>     05-20  Mg     8.9<H>     05-20  Mg     7.7<H>     05-20    TPro  5.9<L>  /  Alb  2.9<L>  /  TBili  0.2  /  DBili  x   /  AST  36  /  ALT  32  /  AlkPhos  145<H>  05-22-23 @ 06:03  TPro  6.2  /  Alb  3.3  /  TBili  0.5  /  DBili  x   /  AST  44<H>  /  ALT  44  /  AlkPhos  163<H>  05-21-23 @ 08:25  TPro  6.6  /  Alb  3.1<L>  /  TBili  0.5  /  DBili  x   /  AST  60<H>  /  ALT  55<H>  /  AlkPhos  177<H>  05-20-23 @ 21:52  TPro  7.6  /  Alb  3.8  /  TBili  0.5  /  DBili  x   /  AST  79<H>  /  ALT  67<H>  /  AlkPhos  202<H>  05-20-23 @ 15:41          PE:  General: NAD  Heart: extremities well-perfused  Lungs: breathing normally  Abdomen: Soft, appropriately tender, incision c/d/i, fundus firm  Extremities: No erythema, no pitting edema
OB Progress Note: LTCS, POD#4    S: 31yo POD#4 s/p LTCS. Pain is well controlled. She is tolerating a regular diet and passing flatus. She is voiding spontaneously, and ambulating without difficulty. Endorses light vaginal bleeding, soaking <1 pad/hr. Denies CP/SOB. Denies lightheadedness/dizziness. Denies N/V.    O:  Vitals:  Vital Signs Last 24 Hrs  T(C): 36.7 (24 May 2023 00:09), Max: 37.1 (23 May 2023 17:05)  T(F): 98 (24 May 2023 00:09), Max: 98.7 (23 May 2023 17:05)  HR: 77 (24 May 2023 00:09) (77 - 93)  BP: 123/74 (24 May 2023 00:09) (123/74 - 163/93)  BP(mean): --  RR: 18 (24 May 2023 00:09) (18 - 19)  SpO2: 97% (24 May 2023 00:09) (97% - 99%)    Parameters below as of 24 May 2023 00:09  Patient On (Oxygen Delivery Method): room air        MEDICATIONS  (STANDING):  acetaminophen     Tablet .. 975 milliGRAM(s) Oral <User Schedule>  diphtheria/tetanus/pertussis (acellular) Vaccine (Adacel) 0.5 milliLiter(s) IntraMuscular once  heparin   Injectable 5000 Unit(s) SubCutaneous every 12 hours  ibuprofen  Tablet. 600 milliGRAM(s) Oral every 6 hours  lactated ringers. 1000 milliLiter(s) (75 mL/Hr) IV Continuous <Continuous>  levothyroxine 25 MICROGram(s) Oral daily  NIFEdipine XL 30 milliGRAM(s) Oral daily  NIFEdipine XL 60 milliGRAM(s) Oral daily  oxytocin Infusion 333.333 milliUNIT(s)/Min (1000 mL/Hr) IV Continuous <Continuous>      MEDICATIONS  (PRN):  diphenhydrAMINE 25 milliGRAM(s) Oral every 6 hours PRN Pruritus  lanolin Ointment 1 Application(s) Topical every 6 hours PRN Sore Nipples  magnesium hydroxide Suspension 30 milliLiter(s) Oral two times a day PRN Constipation  oxyCODONE    IR 5 milliGRAM(s) Oral every 3 hours PRN Moderate to Severe Pain (4-10)  oxyCODONE    IR 5 milliGRAM(s) Oral once PRN Moderate to Severe Pain (4-10)  simethicone 80 milliGRAM(s) Chew every 4 hours PRN Gas      Labs:  Blood type: O Positive  Rubella IgG: RPR: Negative                          12.0   15.08<H> >-----------< 281    ( 05-22 @ 06:03 )             36.0                        12.0   24.16<H> >-----------< 261    ( 05-21 @ 14:30 )             35.5                        12.5   26.26<H> >-----------< 267    ( 05-21 @ 08:24 )             37.6    05-22-23 @ 06:03      137  |  105  |  13  ----------------------------<  111<H>  4.3   |  21<L>  |  0.96    05-21-23 @ 08:25      135  |  100  |  11  ----------------------------<  111<H>  4.5   |  21<L>  |  1.03        Ca    6.8<L>      22 May 2023 06:03  Ca    6.6<L>      21 May 2023 08:25  Mg     6.6<H>     05-21  Mg     7.0<H>     05-21    TPro  5.9<L>  /  Alb  2.9<L>  /  TBili  0.2  /  DBili  x   /  AST  36  /  ALT  32  /  AlkPhos  145<H>  05-22-23 @ 06:03  TPro  6.2  /  Alb  3.3  /  TBili  0.5  /  DBili  x   /  AST  44<H>  /  ALT  44  /  AlkPhos  163<H>  05-21-23 @ 08:25          PE:  General: NAD  Heart: extremities well-perfused  Lungs: breathing normally  Abdomen: Soft, appropriately tender, incision c/d/i, fundus firm  Extremities: No erythema, no pitting edema

## 2023-06-06 DIAGNOSIS — O13.9 GESTATIONAL [PREGNANCY-INDUCED] HYPERTENSION W/OUT SIGNIFICANT PROTEINURIA, UNSPECIFIED TRIMESTER: ICD-10-CM

## 2023-06-06 DIAGNOSIS — E66.9 OBESITY, UNSPECIFIED: ICD-10-CM

## 2023-06-06 DIAGNOSIS — O24.419 GESTATIONAL DIABETES MELLITUS IN PREGNANCY, UNSPECIFIED CONTROL: ICD-10-CM

## 2023-06-06 RX ORDER — AMOXICILLIN 875 MG/1
875 TABLET, FILM COATED ORAL
Qty: 20 | Refills: 0 | Status: DISCONTINUED | COMMUNITY
Start: 2022-07-27 | End: 2023-06-06

## 2023-06-06 RX ORDER — METHYLPREDNISOLONE 4 MG/1
4 TABLET ORAL
Qty: 1 | Refills: 0 | Status: DISCONTINUED | COMMUNITY
Start: 2021-12-28 | End: 2023-06-06

## 2023-06-06 RX ORDER — TETRACYCLINE HYDROCHLORIDE 500 MG/1
500 CAPSULE ORAL TWICE DAILY
Qty: 14 | Refills: 1 | Status: DISCONTINUED | COMMUNITY
Start: 2022-01-06 | End: 2023-06-06

## 2023-06-06 RX ORDER — AMOXICILLIN 875 MG/1
875 TABLET, FILM COATED ORAL
Qty: 14 | Refills: 1 | Status: DISCONTINUED | COMMUNITY
Start: 2022-10-31 | End: 2023-06-06

## 2023-06-06 RX ORDER — TRIAMCINOLONE ACETONIDE 1 MG/G
0.1 CREAM TOPICAL
Qty: 15 | Refills: 0 | Status: DISCONTINUED | COMMUNITY
Start: 2022-06-02 | End: 2023-06-06

## 2023-06-06 RX ORDER — AMOXICILLIN AND CLAVULANATE POTASSIUM 875; 125 MG/1; MG/1
875-125 TABLET, COATED ORAL
Qty: 14 | Refills: 0 | Status: DISCONTINUED | COMMUNITY
Start: 2022-01-07 | End: 2023-06-06

## 2023-06-06 RX ORDER — NALOXONE HYDROCHLORIDE 4 MG/.1ML
4 SPRAY NASAL
Qty: 2 | Refills: 0 | Status: DISCONTINUED | COMMUNITY
Start: 2022-05-27 | End: 2023-06-06

## 2023-06-06 RX ORDER — DIAZEPAM 5 MG/1
5 TABLET ORAL
Qty: 2 | Refills: 0 | Status: DISCONTINUED | COMMUNITY
Start: 2022-03-17 | End: 2023-06-06

## 2023-06-08 ENCOUNTER — NON-APPOINTMENT (OUTPATIENT)
Age: 33
End: 2023-06-08

## 2023-06-08 ENCOUNTER — APPOINTMENT (OUTPATIENT)
Dept: CARDIOLOGY | Facility: CLINIC | Age: 33
End: 2023-06-08
Payer: COMMERCIAL

## 2023-06-08 VITALS
BODY MASS INDEX: 34.36 KG/M2 | HEIGHT: 60 IN | HEART RATE: 114 BPM | WEIGHT: 175 LBS | SYSTOLIC BLOOD PRESSURE: 124 MMHG | DIASTOLIC BLOOD PRESSURE: 86 MMHG | OXYGEN SATURATION: 100 %

## 2023-06-08 DIAGNOSIS — R60.9 EDEMA, UNSPECIFIED: ICD-10-CM

## 2023-06-08 PROCEDURE — 93000 ELECTROCARDIOGRAM COMPLETE: CPT

## 2023-06-08 PROCEDURE — 99203 OFFICE O/P NEW LOW 30 MIN: CPT | Mod: 25

## 2023-06-08 RX ORDER — CIPROFLOXACIN AND DEXAMETHASONE 3; 1 MG/ML; MG/ML
0.3-0.1 SUSPENSION/ DROPS AURICULAR (OTIC)
Qty: 1 | Refills: 1 | Status: DISCONTINUED | COMMUNITY
Start: 2022-10-31 | End: 2023-06-08

## 2023-06-08 RX ORDER — METHYLPREDNISOLONE 4 MG/1
4 TABLET ORAL
Qty: 1 | Refills: 0 | Status: DISCONTINUED | COMMUNITY
Start: 2022-01-03 | End: 2023-06-08

## 2023-06-08 RX ORDER — LEVOTHYROXINE SODIUM 0.03 MG/1
25 TABLET ORAL
Qty: 90 | Refills: 0 | Status: DISCONTINUED | COMMUNITY
Start: 2022-10-18 | End: 2023-06-08

## 2023-06-08 RX ORDER — CLOMIPHENE CITRATE 50 MG/1
50 TABLET ORAL
Qty: 10 | Refills: 0 | Status: DISCONTINUED | COMMUNITY
Start: 2022-09-23 | End: 2023-06-08

## 2023-06-08 RX ORDER — FLUTICASONE PROPIONATE 50 UG/1
50 SPRAY, METERED NASAL DAILY
Qty: 1 | Refills: 3 | Status: DISCONTINUED | COMMUNITY
Start: 2017-02-27 | End: 2023-06-08

## 2023-06-08 RX ORDER — METHYLPREDNISOLONE 4 MG/1
4 TABLET ORAL
Qty: 1 | Refills: 0 | Status: DISCONTINUED | COMMUNITY
Start: 2021-12-28 | End: 2023-06-08

## 2023-06-08 RX ORDER — LEVOFLOXACIN 750 MG/1
750 TABLET, FILM COATED ORAL DAILY
Qty: 7 | Refills: 0 | Status: DISCONTINUED | COMMUNITY
Start: 2022-01-03 | End: 2023-06-08

## 2023-06-08 RX ORDER — FLUTICASONE PROPIONATE 50 UG/1
50 SPRAY, METERED NASAL DAILY
Qty: 1 | Refills: 3 | Status: DISCONTINUED | COMMUNITY
Start: 2022-10-31 | End: 2023-06-08

## 2023-06-08 RX ORDER — CETIRIZINE HYDROCHLORIDE 10 MG/1
10 TABLET, COATED ORAL
Qty: 14 | Refills: 0 | Status: DISCONTINUED | COMMUNITY
Start: 2022-06-14 | End: 2023-06-08

## 2023-06-08 RX ORDER — CIPROFLOXACIN HYDROCHLORIDE 500 MG/1
500 TABLET, FILM COATED ORAL
Qty: 14 | Refills: 2 | Status: DISCONTINUED | COMMUNITY
Start: 2022-01-03 | End: 2023-06-08

## 2023-06-08 RX ORDER — METHYLPREDNISOLONE 4 MG/1
4 TABLET ORAL
Qty: 1 | Refills: 0 | Status: DISCONTINUED | COMMUNITY
Start: 2017-02-27 | End: 2023-06-08

## 2023-06-08 NOTE — HISTORY OF PRESENT ILLNESS
[FreeTextEntry1] : Ruthie is a 32-year-old female s/p c section 5/20/23 first pregnancy. Last week of pregnancy htn and was induced then section. Home on nifedipine 30AM 60mg PM. Hypothyroid in pregnancy and on 25mcg levothyroxine. BP home normal

## 2023-06-08 NOTE — DISCUSSION/SUMMARY
[EKG obtained to assist in diagnosis and management of assessed problem(s)] : EKG obtained to assist in diagnosis and management of assessed problem(s) [FreeTextEntry1] : The patient is a 32-year-old female hypothyroid, postpartum preeclampsia with tachycardia and LE edema whose BP is improving. \par #1 CV- Sinus rhythm, ECHO ordered, edema from medications, pregnancy or cardiac\par #2 HTN- decrease nifedipine to 30mg bid, continue to monitor, email provided and telehealth 10 days\par #3 Thyroid- contact Ob re continuing medicaiton\par #4 Tachycardia- multifactorial, anxiety, thyroid, nifedipine

## 2023-06-08 NOTE — REVIEW OF SYSTEMS
[Palpitations] : palpitations [Negative] : Heme/Lymph [SOB] : no shortness of breath [Dyspnea on exertion] : not dyspnea during exertion [Chest Discomfort] : no chest discomfort

## 2023-06-12 LAB — SURGICAL PATHOLOGY STUDY: SIGNIFICANT CHANGE UP

## 2023-06-16 ENCOUNTER — APPOINTMENT (OUTPATIENT)
Dept: CARDIOLOGY | Facility: CLINIC | Age: 33
End: 2023-06-16
Payer: COMMERCIAL

## 2023-06-16 PROCEDURE — 93306 TTE W/DOPPLER COMPLETE: CPT

## 2023-06-19 ENCOUNTER — APPOINTMENT (OUTPATIENT)
Dept: CARDIOLOGY | Facility: CLINIC | Age: 33
End: 2023-06-19
Payer: COMMERCIAL

## 2023-06-19 DIAGNOSIS — E03.9 HYPOTHYROIDISM, UNSPECIFIED: ICD-10-CM

## 2023-06-19 PROCEDURE — 99212 OFFICE O/P EST SF 10 MIN: CPT | Mod: 95

## 2023-06-19 NOTE — HISTORY OF PRESENT ILLNESS
[Home] : at home, [unfilled] , at the time of the visit. [Medical Office: ([facility])___] : at the medical office located in  [Verbal consent obtained from patient] : the patient, [unfilled] [FreeTextEntry1] : Ruthie feels much better. Thyroid 0.74 so she stopped meds and heart rate is much better. /65 118/73, 106/70 in AM at 9 124/84 same

## 2023-06-19 NOTE — DISCUSSION/SUMMARY
[FreeTextEntry1] : The patient is a 32-year-old female hypothyroid, postpartum preeclampsia with tachycardia and LE edema  that resolved and whose BP is improving. \par #1 CV- Sinus rhythm, ECHO normal \par #2 HTN- c/w nifedipine 30mg bid, continue to monitor, email provided and telehealth 10 days\par #3 Thyroid- stopped levothyroxine and tachycardia resolved\par

## 2023-07-03 ENCOUNTER — APPOINTMENT (OUTPATIENT)
Dept: CARDIOLOGY | Facility: CLINIC | Age: 33
End: 2023-07-03
Payer: COMMERCIAL

## 2023-07-03 PROCEDURE — 99212 OFFICE O/P EST SF 10 MIN: CPT | Mod: 95

## 2023-07-03 NOTE — DISCUSSION/SUMMARY
[FreeTextEntry1] : The patient is a 32-year-old female hypothyroid, postpartum preeclampsia with tachycardia and LE edema  that resolved and whose BP has recovered\par #1 CV- Sinus rhythm, ECHO normal \par #2 HTN- stop nifedipine, continue to monitor and parameters reviewed.\par #3 Thyroid- stopped levothyroxine and tachycardia resolved\par

## 2023-07-03 NOTE — HISTORY OF PRESENT ILLNESS
[Home] : at home, [unfilled] , at the time of the visit. [Medical Office: ([facility])___] : at the medical office located in  [Verbal consent obtained from patient] : the patient, [unfilled] [FreeTextEntry1] : Ruthie is feeling well on one a day and BP in the morning has been very low. Goes up as the day progresses but no high readings. No lightheadedness or dizziness.

## 2023-07-18 ENCOUNTER — NON-APPOINTMENT (OUTPATIENT)
Age: 33
End: 2023-07-18

## 2023-07-19 NOTE — DISCHARGE NOTE OB - MEDICATION SUMMARY - MEDICATIONS TO CHANGE
s/p  at 0030. Shoulder dystocia, second degree laceration repaired, vaginal packing and meyers catheter in place. QBL 905cc. CBC sent at 0330 WNL. Fundus is form with light bleeding
I will SWITCH the dose or number of times a day I take the medications listed below when I get home from the hospital:  None

## 2023-07-20 ENCOUNTER — APPOINTMENT (OUTPATIENT)
Dept: OTOLARYNGOLOGY | Facility: CLINIC | Age: 33
End: 2023-07-20

## 2023-09-14 ENCOUNTER — APPOINTMENT (OUTPATIENT)
Dept: CARDIOLOGY | Facility: CLINIC | Age: 33
End: 2023-09-14
Payer: COMMERCIAL

## 2023-09-14 PROCEDURE — 99212 OFFICE O/P EST SF 10 MIN: CPT | Mod: 95

## 2023-11-28 ENCOUNTER — NON-APPOINTMENT (OUTPATIENT)
Age: 33
End: 2023-11-28

## 2023-11-29 ENCOUNTER — APPOINTMENT (OUTPATIENT)
Dept: OTOLARYNGOLOGY | Facility: CLINIC | Age: 33
End: 2023-11-29
Payer: COMMERCIAL

## 2023-11-29 VITALS
HEART RATE: 118 BPM | BODY MASS INDEX: 34.36 KG/M2 | SYSTOLIC BLOOD PRESSURE: 132 MMHG | WEIGHT: 175 LBS | DIASTOLIC BLOOD PRESSURE: 91 MMHG | OXYGEN SATURATION: 98 % | HEIGHT: 60 IN

## 2023-11-29 DIAGNOSIS — R09.82 POSTNASAL DRIP: ICD-10-CM

## 2023-11-29 DIAGNOSIS — R05.3 CHRONIC COUGH: ICD-10-CM

## 2023-11-29 DIAGNOSIS — H69.91 UNSPECIFIED EUSTACHIAN TUBE DISORDER, RIGHT EAR: ICD-10-CM

## 2023-11-29 DIAGNOSIS — H61.21 IMPACTED CERUMEN, RIGHT EAR: ICD-10-CM

## 2023-11-29 PROCEDURE — 31231 NASAL ENDOSCOPY DX: CPT

## 2023-11-29 PROCEDURE — 99214 OFFICE O/P EST MOD 30 MIN: CPT | Mod: 25

## 2024-01-16 ENCOUNTER — APPOINTMENT (OUTPATIENT)
Dept: CARDIOLOGY | Facility: CLINIC | Age: 34
End: 2024-01-16
Payer: COMMERCIAL

## 2024-01-16 ENCOUNTER — NON-APPOINTMENT (OUTPATIENT)
Age: 34
End: 2024-01-16

## 2024-01-16 VITALS
WEIGHT: 175 LBS | BODY MASS INDEX: 34.36 KG/M2 | OXYGEN SATURATION: 100 % | SYSTOLIC BLOOD PRESSURE: 171 MMHG | HEART RATE: 137 BPM | DIASTOLIC BLOOD PRESSURE: 105 MMHG | HEIGHT: 60 IN

## 2024-01-16 DIAGNOSIS — R00.0 TACHYCARDIA, UNSPECIFIED: ICD-10-CM

## 2024-01-16 PROCEDURE — 99213 OFFICE O/P EST LOW 20 MIN: CPT | Mod: 25

## 2024-01-16 PROCEDURE — 93000 ELECTROCARDIOGRAM COMPLETE: CPT

## 2024-01-16 RX ORDER — NIFEDIPINE 60 MG
60 TABLET, EXTENDED RELEASE ORAL
Refills: 0 | Status: DISCONTINUED | COMMUNITY
End: 2024-01-16

## 2024-01-16 RX ORDER — NIFEDIPINE 30 MG/1
30 TABLET, FILM COATED, EXTENDED RELEASE ORAL
Qty: 60 | Refills: 1 | Status: DISCONTINUED | COMMUNITY
Start: 1900-01-01 | End: 2024-01-16

## 2024-01-16 RX ORDER — METHYLPREDNISOLONE 4 MG/1
4 TABLET ORAL
Qty: 1 | Refills: 2 | Status: DISCONTINUED | COMMUNITY
Start: 2023-11-29 | End: 2024-01-16

## 2024-01-16 RX ORDER — LABETALOL HYDROCHLORIDE 100 MG/1
100 TABLET, FILM COATED ORAL
Qty: 90 | Refills: 1 | Status: ACTIVE | COMMUNITY
Start: 2024-01-16 | End: 1900-01-01

## 2024-01-16 RX ORDER — LEVOTHYROXINE SODIUM 0.03 MG/1
25 TABLET ORAL
Qty: 90 | Refills: 1 | Status: DISCONTINUED | COMMUNITY
End: 2024-01-16

## 2024-01-16 NOTE — HISTORY OF PRESENT ILLNESS
[FreeTextEntry1] : Ruthie had her baby last summer and recently not feeling well. Not sure if anxiety or BP.  130s/90s at home. She knows she is anxious. Family not close by and  now [place] the city. No HA blurry vision or dizziness. Sometimes hard to take deep breath.

## 2024-01-16 NOTE — DISCUSSION/SUMMARY
[FreeTextEntry1] : The patient is a 33-year-old female hypothyroid, postpartum preeclampsia now with slight elevated BP and heart rate. #1 CV- Sinus rhythm, ECHO normal  #2 HTN- start labetalol 100mg daily, stress mgmt #3 General- on OCP, consider progesterone only OCP, consider medication for anxiety- discuss with PCP  [EKG obtained to assist in diagnosis and management of assessed problem(s)] : EKG obtained to assist in diagnosis and management of assessed problem(s)

## 2024-02-01 ENCOUNTER — NON-APPOINTMENT (OUTPATIENT)
Age: 34
End: 2024-02-01

## 2024-02-02 RX ORDER — AZELASTINE HYDROCHLORIDE 137 UG/1
0.1 SPRAY, METERED NASAL TWICE DAILY
Qty: 1 | Refills: 5 | Status: ACTIVE | COMMUNITY
Start: 2024-02-02 | End: 1900-01-01

## 2024-02-15 ENCOUNTER — APPOINTMENT (OUTPATIENT)
Dept: CARDIOLOGY | Facility: CLINIC | Age: 34
End: 2024-02-15

## 2024-05-09 ENCOUNTER — APPOINTMENT (OUTPATIENT)
Dept: DERMATOLOGY | Facility: CLINIC | Age: 34
End: 2024-05-09
Payer: COMMERCIAL

## 2024-05-09 PROCEDURE — 99203 OFFICE O/P NEW LOW 30 MIN: CPT

## 2024-05-29 ENCOUNTER — APPOINTMENT (OUTPATIENT)
Dept: DERMATOLOGY | Facility: CLINIC | Age: 34
End: 2024-05-29
Payer: COMMERCIAL

## 2024-05-29 PROCEDURE — 99214 OFFICE O/P EST MOD 30 MIN: CPT

## 2024-06-21 NOTE — OB PROVIDER DELIVERY SUMMARY - [facility]GAN[facility]OR_OBGYN_A_OB
Medication and surgery instructions given verbally to patient. Hard copy provided. Patient verbalized understanding.      While in Labor & Delivery

## 2024-09-23 ENCOUNTER — APPOINTMENT (OUTPATIENT)
Dept: RHEUMATOLOGY | Facility: CLINIC | Age: 34
End: 2024-09-23

## 2024-10-07 ENCOUNTER — APPOINTMENT (OUTPATIENT)
Dept: RHEUMATOLOGY | Facility: CLINIC | Age: 34
End: 2024-10-07

## 2024-11-07 ENCOUNTER — APPOINTMENT (OUTPATIENT)
Dept: OTOLARYNGOLOGY | Facility: CLINIC | Age: 34
End: 2024-11-07

## 2024-11-12 ENCOUNTER — APPOINTMENT (OUTPATIENT)
Dept: OTOLARYNGOLOGY | Facility: CLINIC | Age: 34
End: 2024-11-12

## 2025-01-09 ENCOUNTER — APPOINTMENT (OUTPATIENT)
Dept: OTOLARYNGOLOGY | Facility: CLINIC | Age: 35
End: 2025-01-09

## 2025-01-09 ENCOUNTER — APPOINTMENT (OUTPATIENT)
Dept: OTOLARYNGOLOGY | Facility: CLINIC | Age: 35
End: 2025-01-09
Payer: COMMERCIAL

## 2025-01-09 DIAGNOSIS — H93.8X3 OTHER SPECIFIED DISORDERS OF EAR, BILATERAL: ICD-10-CM

## 2025-01-09 DIAGNOSIS — H61.21 IMPACTED CERUMEN, RIGHT EAR: ICD-10-CM

## 2025-01-09 PROCEDURE — 99213 OFFICE O/P EST LOW 20 MIN: CPT | Mod: 25
